# Patient Record
Sex: FEMALE | Race: WHITE | Employment: OTHER | URBAN - METROPOLITAN AREA
[De-identification: names, ages, dates, MRNs, and addresses within clinical notes are randomized per-mention and may not be internally consistent; named-entity substitution may affect disease eponyms.]

---

## 2018-05-01 ENCOUNTER — TRANSFERRED RECORDS (OUTPATIENT)
Dept: HEALTH INFORMATION MANAGEMENT | Facility: CLINIC | Age: 68
End: 2018-05-01

## 2019-01-16 ENCOUNTER — TRANSFERRED RECORDS (OUTPATIENT)
Dept: HEALTH INFORMATION MANAGEMENT | Facility: CLINIC | Age: 69
End: 2019-01-16

## 2019-02-18 ENCOUNTER — TRANSFERRED RECORDS (OUTPATIENT)
Dept: HEALTH INFORMATION MANAGEMENT | Facility: CLINIC | Age: 69
End: 2019-02-18

## 2019-05-14 ENCOUNTER — TRANSFERRED RECORDS (OUTPATIENT)
Dept: HEALTH INFORMATION MANAGEMENT | Facility: CLINIC | Age: 69
End: 2019-05-14

## 2019-06-11 RX ORDER — ALENDRONATE SODIUM 70 MG/1
70 TABLET ORAL
COMMUNITY

## 2019-06-11 RX ORDER — POTASSIUM CHLORIDE 600 MG/1
8 TABLET, FILM COATED, EXTENDED RELEASE ORAL DAILY
COMMUNITY

## 2019-06-11 RX ORDER — BISOPROLOL FUMARATE AND HYDROCHLOROTHIAZIDE 2.5; 6.25 MG/1; MG/1
1 TABLET ORAL DAILY
COMMUNITY

## 2019-06-11 RX ORDER — ASPIRIN 81 MG/1
81 TABLET ORAL DAILY
Status: ON HOLD | COMMUNITY
End: 2019-06-26

## 2019-06-11 RX ORDER — ATORVASTATIN CALCIUM 40 MG/1
40 TABLET, FILM COATED ORAL DAILY
COMMUNITY

## 2019-06-11 RX ORDER — AMOXICILLIN 500 MG
1200 CAPSULE ORAL DAILY
COMMUNITY

## 2019-06-11 RX ORDER — ESCITALOPRAM OXALATE 20 MG/1
20 TABLET ORAL DAILY
COMMUNITY

## 2019-06-11 RX ORDER — AMLODIPINE AND BENAZEPRIL HYDROCHLORIDE 5; 20 MG/1; MG/1
1 CAPSULE ORAL DAILY
COMMUNITY

## 2019-06-11 ASSESSMENT — MIFFLIN-ST. JEOR: SCORE: 1549.19

## 2019-06-13 ENCOUNTER — HOSPITAL ENCOUNTER (OUTPATIENT)
Dept: LAB | Facility: CLINIC | Age: 69
Discharge: HOME | End: 2019-06-13
Attending: ORTHOPAEDIC SURGERY | Admitting: ORTHOPAEDIC SURGERY

## 2019-06-13 DIAGNOSIS — Z01.812 PRE-OPERATIVE LABORATORY EXAMINATION: ICD-10-CM

## 2019-06-13 LAB
MRSA DNA SPEC QL NAA+PROBE: NEGATIVE
SPECIMEN SOURCE: NORMAL

## 2019-06-13 PROCEDURE — 87640 STAPH A DNA AMP PROBE: CPT | Performed by: ORTHOPAEDIC SURGERY

## 2019-06-13 PROCEDURE — 87641 MR-STAPH DNA AMP PROBE: CPT | Performed by: ORTHOPAEDIC SURGERY

## 2019-06-13 PROCEDURE — 40000829 ZZHCL STATISTIC STAPH AUREUS SUSCEPT SCREEN PCR: Performed by: ORTHOPAEDIC SURGERY

## 2019-06-13 PROCEDURE — 40000830 ZZHCL STATISTIC STAPH AUREUS METH RESIST SCREEN PCR: Performed by: ORTHOPAEDIC SURGERY

## 2019-06-26 ENCOUNTER — ANESTHESIA EVENT (OUTPATIENT)
Dept: SURGERY | Facility: CLINIC | Age: 69
DRG: 470 | End: 2019-06-26
Payer: MEDICARE

## 2019-06-26 ENCOUNTER — APPOINTMENT (OUTPATIENT)
Dept: PHYSICAL THERAPY | Facility: CLINIC | Age: 69
DRG: 470 | End: 2019-06-26
Attending: ORTHOPAEDIC SURGERY
Payer: MEDICARE

## 2019-06-26 ENCOUNTER — HOSPITAL ENCOUNTER (INPATIENT)
Facility: CLINIC | Age: 69
LOS: 2 days | Discharge: HOME | DRG: 470 | End: 2019-06-28
Attending: ORTHOPAEDIC SURGERY | Admitting: ORTHOPAEDIC SURGERY
Payer: MEDICARE

## 2019-06-26 ENCOUNTER — APPOINTMENT (OUTPATIENT)
Dept: GENERAL RADIOLOGY | Facility: CLINIC | Age: 69
DRG: 470 | End: 2019-06-26
Attending: ORTHOPAEDIC SURGERY
Payer: MEDICARE

## 2019-06-26 ENCOUNTER — ANESTHESIA (OUTPATIENT)
Dept: SURGERY | Facility: CLINIC | Age: 69
DRG: 470 | End: 2019-06-26
Payer: MEDICARE

## 2019-06-26 DIAGNOSIS — Z96.651 STATUS POST TOTAL RIGHT KNEE REPLACEMENT: Primary | ICD-10-CM

## 2019-06-26 PROBLEM — Z96.659 S/P TOTAL KNEE ARTHROPLASTY: Status: ACTIVE | Noted: 2019-06-26

## 2019-06-26 LAB
CREAT SERPL-MCNC: 0.52 MG/DL (ref 0.52–1.04)
GFR SERPL CREATININE-BSD FRML MDRD: >90 ML/MIN/{1.73_M2}
PLATELET # BLD AUTO: 245 10E9/L (ref 150–450)

## 2019-06-26 PROCEDURE — 37000009 ZZH ANESTHESIA TECHNICAL FEE, EACH ADDTL 15 MIN: Performed by: ORTHOPAEDIC SURGERY

## 2019-06-26 PROCEDURE — 36000063 ZZH SURGERY LEVEL 4 EA 15 ADDTL MIN: Performed by: ORTHOPAEDIC SURGERY

## 2019-06-26 PROCEDURE — C1776 JOINT DEVICE (IMPLANTABLE): HCPCS | Performed by: ORTHOPAEDIC SURGERY

## 2019-06-26 PROCEDURE — 36000093 ZZH SURGERY LEVEL 4 1ST 30 MIN: Performed by: ORTHOPAEDIC SURGERY

## 2019-06-26 PROCEDURE — 71000013 ZZH RECOVERY PHASE 1 LEVEL 1 EA ADDTL HR: Performed by: ORTHOPAEDIC SURGERY

## 2019-06-26 PROCEDURE — 25000132 ZZH RX MED GY IP 250 OP 250 PS 637: Mod: GY | Performed by: ORTHOPAEDIC SURGERY

## 2019-06-26 PROCEDURE — 25000125 ZZHC RX 250: Performed by: PHYSICIAN ASSISTANT

## 2019-06-26 PROCEDURE — 25800030 ZZH RX IP 258 OP 636: Performed by: ORTHOPAEDIC SURGERY

## 2019-06-26 PROCEDURE — 27810169 ZZH OR IMPLANT GENERAL: Performed by: ORTHOPAEDIC SURGERY

## 2019-06-26 PROCEDURE — 25800025 ZZH RX 258: Performed by: ORTHOPAEDIC SURGERY

## 2019-06-26 PROCEDURE — 82565 ASSAY OF CREATININE: CPT | Performed by: ORTHOPAEDIC SURGERY

## 2019-06-26 PROCEDURE — 12000000 ZZH R&B MED SURG/OB

## 2019-06-26 PROCEDURE — 37000008 ZZH ANESTHESIA TECHNICAL FEE, 1ST 30 MIN: Performed by: ORTHOPAEDIC SURGERY

## 2019-06-26 PROCEDURE — 25000128 H RX IP 250 OP 636: Performed by: ORTHOPAEDIC SURGERY

## 2019-06-26 PROCEDURE — 97116 GAIT TRAINING THERAPY: CPT | Mod: GP | Performed by: PHYSICAL THERAPIST

## 2019-06-26 PROCEDURE — 25800030 ZZH RX IP 258 OP 636: Performed by: ANESTHESIOLOGY

## 2019-06-26 PROCEDURE — 40000986 XR KNEE PORT RT 1/2 VW: Mod: RT

## 2019-06-26 PROCEDURE — 40000171 ZZH STATISTIC PRE-PROCEDURE ASSESSMENT III: Performed by: ORTHOPAEDIC SURGERY

## 2019-06-26 PROCEDURE — 0SRC0J9 REPLACEMENT OF RIGHT KNEE JOINT WITH SYNTHETIC SUBSTITUTE, CEMENTED, OPEN APPROACH: ICD-10-PCS | Performed by: ORTHOPAEDIC SURGERY

## 2019-06-26 PROCEDURE — 27210794 ZZH OR GENERAL SUPPLY STERILE: Performed by: ORTHOPAEDIC SURGERY

## 2019-06-26 PROCEDURE — 25000128 H RX IP 250 OP 636: Performed by: PHYSICIAN ASSISTANT

## 2019-06-26 PROCEDURE — 71000012 ZZH RECOVERY PHASE 1 LEVEL 1 FIRST HR: Performed by: ORTHOPAEDIC SURGERY

## 2019-06-26 PROCEDURE — 85049 AUTOMATED PLATELET COUNT: CPT | Performed by: ORTHOPAEDIC SURGERY

## 2019-06-26 PROCEDURE — 97110 THERAPEUTIC EXERCISES: CPT | Mod: GP | Performed by: PHYSICAL THERAPIST

## 2019-06-26 PROCEDURE — 99207 ZZC CONSULT E&M CHANGED TO INITIAL LEVEL: CPT | Performed by: INTERNAL MEDICINE

## 2019-06-26 PROCEDURE — 25000125 ZZHC RX 250: Performed by: ORTHOPAEDIC SURGERY

## 2019-06-26 PROCEDURE — 25000132 ZZH RX MED GY IP 250 OP 250 PS 637: Mod: GY | Performed by: PHYSICIAN ASSISTANT

## 2019-06-26 PROCEDURE — 97162 PT EVAL MOD COMPLEX 30 MIN: CPT | Mod: GP | Performed by: PHYSICAL THERAPIST

## 2019-06-26 PROCEDURE — 25000125 ZZHC RX 250: Performed by: ANESTHESIOLOGY

## 2019-06-26 PROCEDURE — 99222 1ST HOSP IP/OBS MODERATE 55: CPT | Performed by: INTERNAL MEDICINE

## 2019-06-26 PROCEDURE — 25000128 H RX IP 250 OP 636: Performed by: ANESTHESIOLOGY

## 2019-06-26 PROCEDURE — 36415 COLL VENOUS BLD VENIPUNCTURE: CPT | Performed by: ORTHOPAEDIC SURGERY

## 2019-06-26 PROCEDURE — 97530 THERAPEUTIC ACTIVITIES: CPT | Mod: GP | Performed by: PHYSICAL THERAPIST

## 2019-06-26 PROCEDURE — 25000128 H RX IP 250 OP 636: Performed by: NURSE ANESTHETIST, CERTIFIED REGISTERED

## 2019-06-26 PROCEDURE — 25000125 ZZHC RX 250: Performed by: NURSE ANESTHETIST, CERTIFIED REGISTERED

## 2019-06-26 DEVICE — IMPLANTABLE DEVICE: Type: IMPLANTABLE DEVICE | Site: KNEE | Status: FUNCTIONAL

## 2019-06-26 DEVICE — IMP COMP TKA IBALANCE MOD TIBIAL TRAY SZ 5 AR-513-T5: Type: IMPLANTABLE DEVICE | Site: KNEE | Status: FUNCTIONAL

## 2019-06-26 DEVICE — IMP COMP TKA IBALANCE FEM PS CEM SZ 5 RT AR-516-5R: Type: IMPLANTABLE DEVICE | Site: KNEE | Status: FUNCTIONAL

## 2019-06-26 DEVICE — BONE CEMENT STRK SIMPLEX P SPEEDSET 6192-1-001: Type: IMPLANTABLE DEVICE | Site: KNEE | Status: FUNCTIONAL

## 2019-06-26 RX ORDER — AMLODIPINE BESYLATE 5 MG/1
5 TABLET ORAL DAILY
Status: DISCONTINUED | OUTPATIENT
Start: 2019-06-26 | End: 2019-06-28 | Stop reason: HOSPADM

## 2019-06-26 RX ORDER — ONDANSETRON 4 MG/1
4 TABLET, ORALLY DISINTEGRATING ORAL EVERY 6 HOURS PRN
Status: DISCONTINUED | OUTPATIENT
Start: 2019-06-26 | End: 2019-06-28 | Stop reason: HOSPADM

## 2019-06-26 RX ORDER — CELECOXIB 200 MG/1
400 CAPSULE ORAL ONCE
Status: COMPLETED | OUTPATIENT
Start: 2019-06-26 | End: 2019-06-26

## 2019-06-26 RX ORDER — HYDRALAZINE HYDROCHLORIDE 20 MG/ML
2.5-5 INJECTION INTRAMUSCULAR; INTRAVENOUS EVERY 10 MIN PRN
Status: DISCONTINUED | OUTPATIENT
Start: 2019-06-26 | End: 2019-06-26 | Stop reason: HOSPADM

## 2019-06-26 RX ORDER — NALOXONE HYDROCHLORIDE 0.4 MG/ML
.1-.4 INJECTION, SOLUTION INTRAMUSCULAR; INTRAVENOUS; SUBCUTANEOUS
Status: DISCONTINUED | OUTPATIENT
Start: 2019-06-26 | End: 2019-06-26 | Stop reason: HOSPADM

## 2019-06-26 RX ORDER — POTASSIUM CHLORIDE 20MEQ/15ML
8 LIQUID (ML) ORAL DAILY
Status: DISCONTINUED | OUTPATIENT
Start: 2019-06-26 | End: 2019-06-28 | Stop reason: HOSPADM

## 2019-06-26 RX ORDER — HYDROMORPHONE HYDROCHLORIDE 1 MG/ML
.3-.5 INJECTION, SOLUTION INTRAMUSCULAR; INTRAVENOUS; SUBCUTANEOUS
Status: DISCONTINUED | OUTPATIENT
Start: 2019-06-26 | End: 2019-06-28 | Stop reason: HOSPADM

## 2019-06-26 RX ORDER — SCOLOPAMINE TRANSDERMAL SYSTEM 1 MG/1
1 PATCH, EXTENDED RELEASE TRANSDERMAL
Status: DISCONTINUED | OUTPATIENT
Start: 2019-06-26 | End: 2019-06-28 | Stop reason: HOSPADM

## 2019-06-26 RX ORDER — HYDROXYZINE HYDROCHLORIDE 25 MG/1
25 TABLET, FILM COATED ORAL 3 TIMES DAILY PRN
Status: DISCONTINUED | OUTPATIENT
Start: 2019-06-26 | End: 2019-06-28 | Stop reason: HOSPADM

## 2019-06-26 RX ORDER — LIDOCAINE 40 MG/G
CREAM TOPICAL
Status: DISCONTINUED | OUTPATIENT
Start: 2019-06-26 | End: 2019-06-28 | Stop reason: HOSPADM

## 2019-06-26 RX ORDER — HYDRALAZINE HYDROCHLORIDE 20 MG/ML
INJECTION INTRAMUSCULAR; INTRAVENOUS PRN
Status: DISCONTINUED | OUTPATIENT
Start: 2019-06-26 | End: 2019-06-26

## 2019-06-26 RX ORDER — SODIUM CHLORIDE, SODIUM LACTATE, POTASSIUM CHLORIDE, CALCIUM CHLORIDE 600; 310; 30; 20 MG/100ML; MG/100ML; MG/100ML; MG/100ML
INJECTION, SOLUTION INTRAVENOUS CONTINUOUS
Status: DISCONTINUED | OUTPATIENT
Start: 2019-06-26 | End: 2019-06-26 | Stop reason: HOSPADM

## 2019-06-26 RX ORDER — BISOPROLOL FUMARATE 5 MG/1
2.5 TABLET, FILM COATED ORAL DAILY
Status: DISCONTINUED | OUTPATIENT
Start: 2019-06-26 | End: 2019-06-28 | Stop reason: HOSPADM

## 2019-06-26 RX ORDER — MEPERIDINE HYDROCHLORIDE 50 MG/ML
12.5 INJECTION INTRAMUSCULAR; INTRAVENOUS; SUBCUTANEOUS
Status: DISCONTINUED | OUTPATIENT
Start: 2019-06-26 | End: 2019-06-26 | Stop reason: HOSPADM

## 2019-06-26 RX ORDER — CHLORAL HYDRATE 500 MG
1000 CAPSULE ORAL DAILY
Status: DISCONTINUED | OUTPATIENT
Start: 2019-06-26 | End: 2019-06-28 | Stop reason: HOSPADM

## 2019-06-26 RX ORDER — ONDANSETRON 4 MG/1
4 TABLET, ORALLY DISINTEGRATING ORAL EVERY 30 MIN PRN
Status: DISCONTINUED | OUTPATIENT
Start: 2019-06-26 | End: 2019-06-26 | Stop reason: HOSPADM

## 2019-06-26 RX ORDER — HYDROCHLOROTHIAZIDE 12.5 MG/1
6.25 TABLET ORAL DAILY
Status: DISCONTINUED | OUTPATIENT
Start: 2019-06-26 | End: 2019-06-28 | Stop reason: HOSPADM

## 2019-06-26 RX ORDER — PROPOFOL 10 MG/ML
INJECTION, EMULSION INTRAVENOUS CONTINUOUS PRN
Status: DISCONTINUED | OUTPATIENT
Start: 2019-06-26 | End: 2019-06-26

## 2019-06-26 RX ORDER — ONDANSETRON 2 MG/ML
4 INJECTION INTRAMUSCULAR; INTRAVENOUS EVERY 6 HOURS PRN
Status: DISCONTINUED | OUTPATIENT
Start: 2019-06-26 | End: 2019-06-28 | Stop reason: HOSPADM

## 2019-06-26 RX ORDER — FENTANYL CITRATE 50 UG/ML
25-50 INJECTION, SOLUTION INTRAMUSCULAR; INTRAVENOUS
Status: DISCONTINUED | OUTPATIENT
Start: 2019-06-26 | End: 2019-06-26 | Stop reason: HOSPADM

## 2019-06-26 RX ORDER — AMOXICILLIN 250 MG
2 CAPSULE ORAL 2 TIMES DAILY
Status: DISCONTINUED | OUTPATIENT
Start: 2019-06-26 | End: 2019-06-28 | Stop reason: HOSPADM

## 2019-06-26 RX ORDER — ONDANSETRON 2 MG/ML
INJECTION INTRAMUSCULAR; INTRAVENOUS PRN
Status: DISCONTINUED | OUTPATIENT
Start: 2019-06-26 | End: 2019-06-26

## 2019-06-26 RX ORDER — MULTIPLE VITAMINS W/ MINERALS TAB 9MG-400MCG
1 TAB ORAL DAILY
Status: DISCONTINUED | OUTPATIENT
Start: 2019-06-26 | End: 2019-06-28 | Stop reason: HOSPADM

## 2019-06-26 RX ORDER — BISOPROLOL FUMARATE AND HYDROCHLOROTHIAZIDE 2.5; 6.25 MG/1; MG/1
1 TABLET ORAL DAILY
Status: DISCONTINUED | OUTPATIENT
Start: 2019-06-26 | End: 2019-06-26 | Stop reason: RX

## 2019-06-26 RX ORDER — PANTOPRAZOLE SODIUM 40 MG/1
40 TABLET, DELAYED RELEASE ORAL ONCE
Status: COMPLETED | OUTPATIENT
Start: 2019-06-26 | End: 2019-06-26

## 2019-06-26 RX ORDER — FENTANYL CITRATE 50 UG/ML
INJECTION, SOLUTION INTRAMUSCULAR; INTRAVENOUS PRN
Status: DISCONTINUED | OUTPATIENT
Start: 2019-06-26 | End: 2019-06-26

## 2019-06-26 RX ORDER — AMOXICILLIN 250 MG
1 CAPSULE ORAL 2 TIMES DAILY
Qty: 30 TABLET | Refills: 0 | Status: SHIPPED | OUTPATIENT
Start: 2019-06-26

## 2019-06-26 RX ORDER — CEFAZOLIN SODIUM 2 G/100ML
2 INJECTION, SOLUTION INTRAVENOUS EVERY 8 HOURS
Status: COMPLETED | OUTPATIENT
Start: 2019-06-26 | End: 2019-06-27

## 2019-06-26 RX ORDER — ONDANSETRON 2 MG/ML
4 INJECTION INTRAMUSCULAR; INTRAVENOUS EVERY 6 HOURS
Status: DISPENSED | OUTPATIENT
Start: 2019-06-26 | End: 2019-06-27

## 2019-06-26 RX ORDER — ESCITALOPRAM OXALATE 20 MG/1
20 TABLET ORAL DAILY
Status: DISCONTINUED | OUTPATIENT
Start: 2019-06-27 | End: 2019-06-28 | Stop reason: HOSPADM

## 2019-06-26 RX ORDER — LISINOPRIL 20 MG/1
20 TABLET ORAL DAILY
Status: DISCONTINUED | OUTPATIENT
Start: 2019-06-26 | End: 2019-06-28 | Stop reason: HOSPADM

## 2019-06-26 RX ORDER — ALBUTEROL SULFATE 0.83 MG/ML
2.5 SOLUTION RESPIRATORY (INHALATION) EVERY 4 HOURS PRN
Status: DISCONTINUED | OUTPATIENT
Start: 2019-06-26 | End: 2019-06-26 | Stop reason: HOSPADM

## 2019-06-26 RX ORDER — FENTANYL CITRATE 50 UG/ML
25-50 INJECTION, SOLUTION INTRAMUSCULAR; INTRAVENOUS EVERY 5 MIN PRN
Status: DISCONTINUED | OUTPATIENT
Start: 2019-06-26 | End: 2019-06-26 | Stop reason: HOSPADM

## 2019-06-26 RX ORDER — LIDOCAINE 40 MG/G
CREAM TOPICAL
Status: DISCONTINUED | OUTPATIENT
Start: 2019-06-26 | End: 2019-06-26

## 2019-06-26 RX ORDER — OXYCODONE HYDROCHLORIDE 5 MG/1
5 TABLET ORAL EVERY 4 HOURS PRN
Status: DISCONTINUED | OUTPATIENT
Start: 2019-06-26 | End: 2019-06-26

## 2019-06-26 RX ORDER — CEFAZOLIN SODIUM 1 G/3ML
1 INJECTION, POWDER, FOR SOLUTION INTRAMUSCULAR; INTRAVENOUS SEE ADMIN INSTRUCTIONS
Status: DISCONTINUED | OUTPATIENT
Start: 2019-06-26 | End: 2019-06-26

## 2019-06-26 RX ORDER — PROPOFOL 10 MG/ML
INJECTION, EMULSION INTRAVENOUS PRN
Status: DISCONTINUED | OUTPATIENT
Start: 2019-06-26 | End: 2019-06-26

## 2019-06-26 RX ORDER — METOPROLOL TARTRATE 1 MG/ML
1-2 INJECTION, SOLUTION INTRAVENOUS EVERY 5 MIN PRN
Status: DISCONTINUED | OUTPATIENT
Start: 2019-06-26 | End: 2019-06-26 | Stop reason: HOSPADM

## 2019-06-26 RX ORDER — DEXAMETHASONE SODIUM PHOSPHATE 4 MG/ML
INJECTION, SOLUTION INTRA-ARTICULAR; INTRALESIONAL; INTRAMUSCULAR; INTRAVENOUS; SOFT TISSUE PRN
Status: DISCONTINUED | OUTPATIENT
Start: 2019-06-26 | End: 2019-06-26

## 2019-06-26 RX ORDER — ONDANSETRON 2 MG/ML
4 INJECTION INTRAMUSCULAR; INTRAVENOUS EVERY 30 MIN PRN
Status: DISCONTINUED | OUTPATIENT
Start: 2019-06-26 | End: 2019-06-26 | Stop reason: HOSPADM

## 2019-06-26 RX ORDER — OXYCODONE AND ACETAMINOPHEN 5; 325 MG/1; MG/1
1-2 TABLET ORAL EVERY 4 HOURS PRN
Qty: 60 TABLET | Refills: 0 | Status: SHIPPED | OUTPATIENT
Start: 2019-06-26

## 2019-06-26 RX ORDER — CELECOXIB 200 MG/1
200 CAPSULE ORAL 2 TIMES DAILY
Status: DISCONTINUED | OUTPATIENT
Start: 2019-06-26 | End: 2019-06-28 | Stop reason: HOSPADM

## 2019-06-26 RX ORDER — SODIUM CHLORIDE, SODIUM LACTATE, POTASSIUM CHLORIDE, CALCIUM CHLORIDE 600; 310; 30; 20 MG/100ML; MG/100ML; MG/100ML; MG/100ML
INJECTION, SOLUTION INTRAVENOUS CONTINUOUS
Status: DISCONTINUED | OUTPATIENT
Start: 2019-06-26 | End: 2019-06-26

## 2019-06-26 RX ORDER — SODIUM CHLORIDE 9 MG/ML
INJECTION, SOLUTION INTRAVENOUS CONTINUOUS
Status: DISCONTINUED | OUTPATIENT
Start: 2019-06-26 | End: 2019-06-28 | Stop reason: HOSPADM

## 2019-06-26 RX ORDER — AMOXICILLIN 250 MG
1 CAPSULE ORAL 2 TIMES DAILY
Status: DISCONTINUED | OUTPATIENT
Start: 2019-06-26 | End: 2019-06-28 | Stop reason: HOSPADM

## 2019-06-26 RX ORDER — NALOXONE HYDROCHLORIDE 0.4 MG/ML
.1-.4 INJECTION, SOLUTION INTRAMUSCULAR; INTRAVENOUS; SUBCUTANEOUS
Status: DISCONTINUED | OUTPATIENT
Start: 2019-06-26 | End: 2019-06-28 | Stop reason: HOSPADM

## 2019-06-26 RX ORDER — CEFAZOLIN SODIUM 2 G/100ML
2 INJECTION, SOLUTION INTRAVENOUS
Status: COMPLETED | OUTPATIENT
Start: 2019-06-26 | End: 2019-06-26

## 2019-06-26 RX ORDER — ACETAMINOPHEN 325 MG/1
975 TABLET ORAL EVERY 8 HOURS
Status: DISCONTINUED | OUTPATIENT
Start: 2019-06-26 | End: 2019-06-28 | Stop reason: HOSPADM

## 2019-06-26 RX ORDER — HYDROMORPHONE HYDROCHLORIDE 1 MG/ML
.3-.5 INJECTION, SOLUTION INTRAMUSCULAR; INTRAVENOUS; SUBCUTANEOUS EVERY 10 MIN PRN
Status: DISCONTINUED | OUTPATIENT
Start: 2019-06-26 | End: 2019-06-26 | Stop reason: HOSPADM

## 2019-06-26 RX ORDER — OXYCODONE HYDROCHLORIDE 5 MG/1
5-10 TABLET ORAL EVERY 4 HOURS PRN
Status: DISCONTINUED | OUTPATIENT
Start: 2019-06-26 | End: 2019-06-28 | Stop reason: HOSPADM

## 2019-06-26 RX ORDER — ACETAMINOPHEN 325 MG/1
650 TABLET ORAL EVERY 4 HOURS PRN
Status: DISCONTINUED | OUTPATIENT
Start: 2019-06-29 | End: 2019-06-28 | Stop reason: HOSPADM

## 2019-06-26 RX ORDER — EPHEDRINE SULFATE 50 MG/ML
INJECTION, SOLUTION INTRAVENOUS PRN
Status: DISCONTINUED | OUTPATIENT
Start: 2019-06-26 | End: 2019-06-26

## 2019-06-26 RX ORDER — GLYCINE 1.5 G/100ML
SOLUTION IRRIGATION PRN
Status: DISCONTINUED | OUTPATIENT
Start: 2019-06-26 | End: 2019-06-26 | Stop reason: HOSPADM

## 2019-06-26 RX ORDER — ATORVASTATIN CALCIUM 40 MG/1
40 TABLET, FILM COATED ORAL DAILY
Status: DISCONTINUED | OUTPATIENT
Start: 2019-06-26 | End: 2019-06-28 | Stop reason: HOSPADM

## 2019-06-26 RX ORDER — ZOLPIDEM TARTRATE 5 MG/1
5 TABLET ORAL
Status: DISCONTINUED | OUTPATIENT
Start: 2019-06-27 | End: 2019-06-28 | Stop reason: HOSPADM

## 2019-06-26 RX ORDER — AMLODIPINE AND BENAZEPRIL HYDROCHLORIDE 5; 20 MG/1; MG/1
1 CAPSULE ORAL DAILY
Status: DISCONTINUED | OUTPATIENT
Start: 2019-06-26 | End: 2019-06-26 | Stop reason: RX

## 2019-06-26 RX ADMIN — ACETAMINOPHEN 975 MG: 325 TABLET, FILM COATED ORAL at 20:13

## 2019-06-26 RX ADMIN — LISINOPRIL 20 MG: 20 TABLET ORAL at 17:07

## 2019-06-26 RX ADMIN — OXYCODONE HYDROCHLORIDE 5 MG: 5 TABLET ORAL at 20:13

## 2019-06-26 RX ADMIN — SODIUM CHLORIDE, POTASSIUM CHLORIDE, SODIUM LACTATE AND CALCIUM CHLORIDE: 600; 310; 30; 20 INJECTION, SOLUTION INTRAVENOUS at 12:40

## 2019-06-26 RX ADMIN — PANTOPRAZOLE SODIUM 40 MG: 40 TABLET, DELAYED RELEASE ORAL at 09:19

## 2019-06-26 RX ADMIN — Medication 1 G: at 11:27

## 2019-06-26 RX ADMIN — Medication 1 G: at 10:11

## 2019-06-26 RX ADMIN — SODIUM CHLORIDE, POTASSIUM CHLORIDE, SODIUM LACTATE AND CALCIUM CHLORIDE: 600; 310; 30; 20 INJECTION, SOLUTION INTRAVENOUS at 11:02

## 2019-06-26 RX ADMIN — ACETAMINOPHEN 975 MG: 325 TABLET, FILM COATED ORAL at 13:39

## 2019-06-26 RX ADMIN — FENTANYL CITRATE 250 MCG: 50 INJECTION, SOLUTION INTRAMUSCULAR; INTRAVENOUS at 10:08

## 2019-06-26 RX ADMIN — SODIUM CHLORIDE 1000 ML: 9 INJECTION, SOLUTION INTRAVENOUS at 17:01

## 2019-06-26 RX ADMIN — HYDROMORPHONE HYDROCHLORIDE 0.5 MG: 1 INJECTION, SOLUTION INTRAMUSCULAR; INTRAVENOUS; SUBCUTANEOUS at 12:30

## 2019-06-26 RX ADMIN — HYDROMORPHONE HYDROCHLORIDE 0.5 MG: 1 INJECTION, SOLUTION INTRAMUSCULAR; INTRAVENOUS; SUBCUTANEOUS at 13:28

## 2019-06-26 RX ADMIN — PROPOFOL 150 MG: 10 INJECTION, EMULSION INTRAVENOUS at 10:08

## 2019-06-26 RX ADMIN — MIDAZOLAM 2 MG: 1 INJECTION INTRAMUSCULAR; INTRAVENOUS at 09:21

## 2019-06-26 RX ADMIN — HYDRALAZINE HYDROCHLORIDE 4 MG: 20 INJECTION INTRAMUSCULAR; INTRAVENOUS at 10:51

## 2019-06-26 RX ADMIN — CEFAZOLIN SODIUM 2 G: 2 INJECTION, SOLUTION INTRAVENOUS at 18:20

## 2019-06-26 RX ADMIN — HYDROMORPHONE HYDROCHLORIDE 0.5 MG: 1 INJECTION, SOLUTION INTRAMUSCULAR; INTRAVENOUS; SUBCUTANEOUS at 10:35

## 2019-06-26 RX ADMIN — CELECOXIB 400 MG: 200 CAPSULE ORAL at 09:19

## 2019-06-26 RX ADMIN — GLYCOPYRROLATE 8 MEQ: 0.2 INJECTION INTRAMUSCULAR; INTRAVENOUS at 17:14

## 2019-06-26 RX ADMIN — CEFAZOLIN SODIUM 2 G: 2 INJECTION, SOLUTION INTRAVENOUS at 09:58

## 2019-06-26 RX ADMIN — CELECOXIB 200 MG: 200 CAPSULE ORAL at 20:13

## 2019-06-26 RX ADMIN — PROPOFOL 50 MCG/KG/MIN: 10 INJECTION, EMULSION INTRAVENOUS at 10:11

## 2019-06-26 RX ADMIN — ATORVASTATIN CALCIUM 40 MG: 40 TABLET, FILM COATED ORAL at 17:07

## 2019-06-26 RX ADMIN — EPHEDRINE SULFATE 5 MG: 50 INJECTION, SOLUTION INTRAVENOUS at 10:14

## 2019-06-26 RX ADMIN — ONDANSETRON 4 MG: 2 INJECTION INTRAMUSCULAR; INTRAVENOUS at 11:34

## 2019-06-26 RX ADMIN — HYDROMORPHONE HYDROCHLORIDE 0.5 MG: 1 INJECTION, SOLUTION INTRAMUSCULAR; INTRAVENOUS; SUBCUTANEOUS at 13:00

## 2019-06-26 RX ADMIN — HYDROMORPHONE HYDROCHLORIDE 0.5 MG: 1 INJECTION, SOLUTION INTRAMUSCULAR; INTRAVENOUS; SUBCUTANEOUS at 13:13

## 2019-06-26 RX ADMIN — PROPOFOL 50 MG: 10 INJECTION, EMULSION INTRAVENOUS at 10:35

## 2019-06-26 RX ADMIN — Medication 6.25 MG: at 17:07

## 2019-06-26 RX ADMIN — DEXAMETHASONE SODIUM PHOSPHATE 4 MG: 4 INJECTION, SOLUTION INTRA-ARTICULAR; INTRALESIONAL; INTRAMUSCULAR; INTRAVENOUS; SOFT TISSUE at 10:08

## 2019-06-26 RX ADMIN — HYDROMORPHONE HYDROCHLORIDE 0.5 MG: 1 INJECTION, SOLUTION INTRAMUSCULAR; INTRAVENOUS; SUBCUTANEOUS at 11:57

## 2019-06-26 RX ADMIN — EPHEDRINE SULFATE 5 MG: 50 INJECTION, SOLUTION INTRAVENOUS at 10:17

## 2019-06-26 RX ADMIN — SCOPALAMINE 1 PATCH: 1 PATCH, EXTENDED RELEASE TRANSDERMAL at 09:19

## 2019-06-26 RX ADMIN — OXYCODONE HYDROCHLORIDE 5 MG: 5 TABLET ORAL at 13:37

## 2019-06-26 RX ADMIN — SENNOSIDES AND DOCUSATE SODIUM 2 TABLET: 8.6; 5 TABLET ORAL at 20:13

## 2019-06-26 RX ADMIN — SODIUM CHLORIDE, POTASSIUM CHLORIDE, SODIUM LACTATE AND CALCIUM CHLORIDE: 600; 310; 30; 20 INJECTION, SOLUTION INTRAVENOUS at 09:03

## 2019-06-26 RX ADMIN — FENTANYL CITRATE 50 MCG: 50 INJECTION INTRAMUSCULAR; INTRAVENOUS at 12:28

## 2019-06-26 RX ADMIN — AMLODIPINE BESYLATE 5 MG: 5 TABLET ORAL at 17:07

## 2019-06-26 RX ADMIN — LIDOCAINE HYDROCHLORIDE 50 MG: 10 INJECTION, SOLUTION EPIDURAL; INFILTRATION; INTRACAUDAL; PERINEURAL at 10:08

## 2019-06-26 RX ADMIN — FENTANYL CITRATE 100 MCG: 50 INJECTION, SOLUTION INTRAMUSCULAR; INTRAVENOUS at 09:21

## 2019-06-26 RX ADMIN — HYDRALAZINE HYDROCHLORIDE 2 MG: 20 INJECTION INTRAMUSCULAR; INTRAVENOUS at 11:54

## 2019-06-26 RX ADMIN — RANITIDINE 150 MG: 150 TABLET ORAL at 20:13

## 2019-06-26 RX ADMIN — SODIUM CHLORIDE: 9 INJECTION, SOLUTION INTRAVENOUS at 17:06

## 2019-06-26 RX ADMIN — FENTANYL CITRATE 50 MCG: 50 INJECTION INTRAMUSCULAR; INTRAVENOUS at 12:37

## 2019-06-26 RX ADMIN — HYDRALAZINE HYDROCHLORIDE 4 MG: 20 INJECTION INTRAMUSCULAR; INTRAVENOUS at 10:39

## 2019-06-26 RX ADMIN — ONDANSETRON 4 MG: 2 INJECTION INTRAMUSCULAR; INTRAVENOUS at 17:02

## 2019-06-26 ASSESSMENT — ACTIVITIES OF DAILY LIVING (ADL)
ADLS_ACUITY_SCORE: 14
ADLS_ACUITY_SCORE: 17

## 2019-06-26 ASSESSMENT — MIFFLIN-ST. JEOR: SCORE: 1576.54

## 2019-06-26 NOTE — CONSULTS
[facility]    Hospitalist Consultation    Date of Admission:  6/26/2019  Date of Consult (When I saw the patient): 06/26/19    Assessment & Plan   Renee Villareal is a 68 year old female patient with past medial history of CAD, osteoarthritis, hypertension, admitted for elective surgery. She underwent right total knee arthroplasty today. We were consulted for postop medical management    1. S/P right TKA, POD#0  Pan management, DVT prophylaxis, PT/OT per primary team    2. CAD   Currently no evidence of chest pain. Resumed on Lipitor. Aspirin on hold for now    3. Hypertension. Resume amlodipine. Will monitor blood pressure closely    DVT Prophylaxis: Defer to primary service  Code Status: Full Code    Disposition: Expected discharge per primary team    Jackie Constantino MD    Reason for Consult   Reason for consult: I was asked by Dr. Bernard for postop medical management     Primary Care Physician   [facility]    Chief Complaint   Postop medical management    History is obtained from the patient    History of Present Illness   Renee Villareal is a 68 year old female patient with past medial history of CAD, osteoarthritis, hypertension, admitted for elective surgery. She underwent right total knee arthroplasty today. We were consulted for postop medical management. Currently she denies shortness of breath or chest pain. Her pain is well controlled. She has no nausea or vomiting.     Past Medical History    I have reviewed this patient's medical history and updated it with pertinent information if needed.   Past Medical History:   Diagnosis Date     Arthritis     OA, knees     Coronary artery disease      Hypertension      PONV (postoperative nausea and vomiting)        Past Surgical History   I have reviewed this patient's surgical history and updated it with pertinent information if needed.  Past Surgical History:   Procedure Laterality Date     CARPAL TUNNEL RELEASE  RT/LT  2018    right     COLONOSCOPY       GYN SURGERY      ovarian cystectomy, in late 20's     ORTHOPEDIC SURGERY  2007    left knee arthroplasty     VASCULAR SURGERY      vein stripping bilat LE       Prior to Admission Medications   Prior to Admission Medications   Prescriptions Last Dose Informant Patient Reported? Taking?   Calcium-Magnesium-Vitamin D (CALCIUM 500 PO) 6/25/2019 at Unknown time  Yes Yes   Sig: Take by mouth 2 times daily   Multiple Vitamins-Minerals (MULTIVITAMIN PO) 6/25/2019 at Unknown time  Yes Yes   Sig: Take by mouth daily   Omega-3 Fatty Acids (FISH OIL) 1200 MG capsule Past Month at Unknown time  Yes Yes   Sig: Take 1,200 mg by mouth daily   alendronate (FOSAMAX) 70 MG tablet Past Week at Unknown time  Yes Yes   Sig: Take 70 mg by mouth every 7 days   amLODIPine-benazepril (LOTREL) 5-20 MG capsule Unknown at Unknown time  Yes No   Sig: Take 1 capsule by mouth daily   aspirin 81 MG EC tablet Past Month at Unknown time  Yes Yes   Sig: Take 81 mg by mouth daily   atorvastatin (LIPITOR) 40 MG tablet 6/25/2019 at Unknown time  Yes Yes   Sig: Take 40 mg by mouth daily   bisoprolol-hydrochlorothiazide (ZIAC) 2.5-6.25 MG tablet Unknown at Unknown time  Yes No   Sig: Take 1 tablet by mouth daily   escitalopram (LEXAPRO) 20 MG tablet 6/26/2019 at Unknown time  Yes Yes   Sig: Take 20 mg by mouth daily   potassium chloride ER (KLOR-CON) 8 MEQ CR tablet 6/25/2019 at Unknown time  Yes Yes   Sig: Take 8 mEq by mouth daily      Facility-Administered Medications: None     Allergies   No Known Allergies    Social History   I have reviewed this patient's social history and updated it with pertinent information if needed. Renee DOUGLAS Mono  reports that she has never smoked. She has never used smokeless tobacco. She reports that she drinks alcohol. She reports that she does not use drugs.    Family History   I have reviewed this patient's family history and updated it with pertinent information if needed.    History reviewed. No pertinent family history.    Review of Systems   The 10 point Review of Systems is negative other than noted in the HPI or here. S/p right TKA    Physical Exam   Temp: 95.5  F (35.3  C) Temp src: Oral BP: 134/60 Pulse: 72 Heart Rate: 61 Resp: 15 SpO2: 91 % O2 Device: Nasal cannula Oxygen Delivery: 3 LPM  Vital Signs with Ranges  Temp:  [95.5  F (35.3  C)-98.7  F (37.1  C)] 95.5  F (35.3  C)  Pulse:  [55-79] 72  Heart Rate:  [54-80] 61  Resp:  [9-24] 15  BP: (111-150)/() 134/60  SpO2:  [90 %-99 %] 91 %  206 lbs 0 oz    GEN:  Alert, oriented x 3, appears comfortable, NAD.  HEENT:  Normocephalic/atraumatic, no scleral icterus, no nasal discharge, mouth moist.  CV:  Regular rate and rhythm, no murmur or JVD.  S1 + S2 noted, no S3 or S4.  LUNGS:  Clear to auscultation bilaterally without rales/rhonchi/wheezing/retractions.  Symmetric chest rise on inhalation noted.  ABD:  Active bowel sounds, soft, non-tender/non-distended.  No rebound/guarding/rigidity.  EXT:  No edema or cyanosis.  Hands/feet warm to touch with good signs of peripheral perfusion.  No joint synovitis noted.  SKIN:  Dry to touch, no exanthems noted in the visualized areas.  NEURO:  Symmetric muscle strength, sensation to touch grossly intact.  No new focal deficits appreciated.    Data   -Data reviewed today: All pertinent laboratory and imaging results from this encounter were reviewed. I personally reviewed no images or EKG's today.  No lab results found in last 7 days.    Recent Results (from the past 24 hour(s))   XR Knee Port Right 1/2 Views    Narrative    RIGHT KNEE TWO VIEWS  6/26/2019 12:26 PM     HISTORY:  Post-op right total knee arthroplasty.    COMPARISON: None.      Impression    IMPRESSION: Right total knee replacement. Skin staples and  periarticular drain in place. Marked overlying soft tissue swelling.  No evidence of fracture or dislocation.    MELBA TANG MD

## 2019-06-26 NOTE — PLAN OF CARE
Patient arrived on floor at 1510 from PACU. Lethargic but oriented. On 3L O2. Tolerating clear liquid diet. Transfers with Ax2, gait belt, KI, and walker. Dressing CDI, numbness to RLE d/t block. Brown putting out adequate amounts, Hemovac patent. Pain managed with scheduled Tylenol. Plans d/c to home.

## 2019-06-26 NOTE — ANESTHESIA PROCEDURE NOTES
Peripheral nerve/Neuraxial procedure note : Saphenous  Pre-Procedure  Performed by Angel Leiva MD  Referred by [facility]  Location: pre-op    Procedure Times:6/26/2019 9:21 AM and 6/26/2019 9:24 AM  Pre-Anesthestic Checklist: patient identified, IV checked, site marked, risks and benefits discussed, informed consent, monitors and equipment checked, pre-op evaluation, at physician/surgeon's request and post-op pain management    Timeout  Correct Patient: Yes   Correct Procedure: Yes   Correct Site: Yes   Correct Laterality: Yes   Correct Position: Yes   Site Marked: Yes   .   Procedure Documentation    Diagnosis:RIGHT KNEE DJD.    Procedure:  right  Saphenous.     Ultrasound used to identify targeted nerve, plexus, or vascular marker and placed a needle adjacent to it., Ultrasound was used to visualize the spread of the anesthetic in close proximity to the above stated nerve.   Patient Prep;mask, sterile gloves, povidone-iodine 7.5% surgical scrub.  .  Needle: insulated, short bevel Needle Gauge: 21.    Needle Length (Inches) 4  Insertion Method: Single Shot.       Assessment/Narrative  Paresthesias: No.  Injection made incrementally with aspirations every 5 mL..  The placement was negative for: blood aspirated, painful injection and site bleeding.  Bolus given via needle..   Secured via.   Complications: none. Comments:  The surgeon has given a verbal order transferring care of this patient to me for the performance of a regional analgesia block for post-op pain control. It is requested of me because I am uniquely trained and qualified to perform this block and the surgeon is neither trained nor qualified to perform this procedure.    20 ml 0.5% bupivacaine

## 2019-06-26 NOTE — ANESTHESIA POSTPROCEDURE EVALUATION
Patient: Renee Villareal    Procedure(s):  Right total knee arthroplasty    Diagnosis:djd  Diagnosis Additional Information: No value filed.    Anesthesia Type:  General, LMA, Periph. Nerve Block for postop pain    Note:  Anesthesia Post Evaluation    Patient location during evaluation: PACU  Patient participation: Able to participate in evaluation but full recovery from regional anesthesia has not yet ocurrred but is anticipated to occur within 48 hours  Level of consciousness: awake  Pain management: adequate  Airway patency: patent  Cardiovascular status: acceptable  Respiratory status: acceptable  Hydration status: acceptable  PONV: controlled     Anesthetic complications: None          Last vitals:  Vitals:    06/26/19 1400 06/26/19 1415 06/26/19 1430   BP: 111/67 127/73 131/82   Pulse:      Resp: 21 12 18   Temp:      SpO2: 95% 96% 98%         Electronically Signed By: Musa Farmer MD  June 26, 2019  2:36 PM

## 2019-06-26 NOTE — PROGRESS NOTES
06/26/19 1621   Quick Adds   Type of Visit Initial PT Evaluation   Living Environment   Lives With spouse   Living Arrangements house   Home Accessibility stairs to enter home;stairs within home   Number of Stairs, Main Entrance 2   Stair Railings, Main Entrance none   Number of Stairs, Within Home, Primary   (flight to lower level)   Transportation Anticipated car, drives self;family or friend will provide   Living Environment Comment Lives with spouse, who will be home/available for support prn.  Has walk in shower, RTS.   Self-Care   Usual Activity Tolerance moderate  (limited by knee pain)   Current Activity Tolerance fair   Regular Exercise No   Equipment Currently Used at Home cane, straight   Activity/Exercise/Self-Care Comment Indep PLOF, uses SEC for longer distances, owns FWW.   Functional Level Prior   Ambulation 1-->assistive equipment   Transferring 0-->independent   Toileting 0-->independent   Bathing 0-->independent   Communication 0-->understands/communicates without difficulty   Swallowing 0-->swallows foods/liquids without difficulty   Cognition 0 - no cognition issues reported   Fall history within last six months no   Which of the above functional risks had a recent onset or change? ambulation;transferring   General Information   Onset of Illness/Injury or Date of Surgery - Date 06/26/19   Referring Physician Willis Bernard MD   Patient/Family Goals Statement dc home with OP PT, pt states she hopes to stay 3 days   Pertinent History of Current Problem (include personal factors and/or comorbidities that impact the POC) POD 0 s/p R TKA.  Past medial history of CAD, osteoarthritis, hypertension, previous L TKA ~12 yrs ago.   Precautions/Limitations fall precautions   Weight-Bearing Status - RLE weight-bearing as tolerated   General Observations pt awake but lethargic post op   Cognitive Status Examination   Orientation orientation to person, place and time   Pain Assessment   Patient  Currently in Pain Yes, see Vital Sign flowsheet  (7-8/10 R knee pain)   Integumentary/Edema   Integumentary/Edema Comments Ace wrap in place, hemovac x 1   Posture    Posture Forward head position   Range of Motion (ROM)   ROM Comment WFL LLE and BUEs, R knee limited ROM post op due to pain, ~12-65 deg AAROM.   Strength   Strength Comments WFL LLE and BUEs, generalized proximal hip weakness. R knee fair quad strength/control post op, min A with SLR, limited by pain and lethargy.   Bed Mobility   Bed Mobility Comments Min A supine <> sit   Transfer Skills   Transfer Comments Min-mod A sit <> stand with FWW and KI donned, limited by pain, weakness, lethargy.   Gait   Gait Comments Amb at bedside x 15ft with FWW, KI, and CGA.  Limited by lethargy and pain post op.   Balance   Balance Comments Impaired standing balance and gait stability post op, compensating well with FWW, Ax1 for safety.   Sensory Examination   Sensory Perception Comments some mild n/t reported R foot post op.   Muscle Tone   Muscle Tone Comments mild mm guarding R knee post op   Modality Interventions   Planned Modality Interventions Cryotherapy   Planned Modality Interventions Comments prn for pain control   General Therapy Interventions   Planned Therapy Interventions balance training;bed mobility training;gait training;neuromuscular re-education;ROM;strengthening;stretching;transfer training;risk factor education;home program guidelines;progressive activity/exercise   Clinical Impression   Criteria for Skilled Therapeutic Intervention yes, treatment indicated   PT Diagnosis Impaired functional mobility s/p TKA   Influenced by the following impairments Pain, weakness, dec ROM, impaired balance   Functional limitations due to impairments Impaired bed mobility, transfers, gait, stairs, ADLs   Clinical Presentation Evolving/Changing   Clinical Presentation Rationale acute post op medical status, PMH, PLOF, currently very lethargic   Clinical Decision  "Making (Complexity) Moderate complexity   Therapy Frequency 2x/day   Predicted Duration of Therapy Intervention (days/wks) 2-3 days   Anticipated Equipment Needs at Discharge   (owns SEC and FWW)   Anticipated Discharge Disposition Home with Assist;Home with Outpatient Therapy   Risk & Benefits of therapy have been explained Yes   Patient, Family & other staff in agreement with plan of care Yes   [facility] TM \"6 Clicks\"   2016, Trustees of [facility], under license to [employer].  All rights reserved.   6 Clicks Short Forms Basic Mobility Inpatient Short Form   [facility]  \"6 Clicks\" V.2 Basic Mobility Inpatient Short Form   1. Turning from your back to your side while in a flat bed without using bedrails? 3 - A Little   2. Moving from lying on your back to sitting on the side of a flat bed without using bedrails? 3 - A Little   3. Moving to and from a bed to a chair (including a wheelchair)? 3 - A Little   4. Standing up from a chair using your arms (e.g., wheelchair, or bedside chair)? 3 - A Little   5. To walk in hospital room? 3 - A Little   6. Climbing 3-5 steps with a railing? 2 - A Lot   Basic Mobility Raw Score (Score out of 24.Lower scores equate to lower levels of function) 17   Total Evaluation Time   Total Evaluation Time (Minutes) 15     "

## 2019-06-26 NOTE — ANESTHESIA CARE TRANSFER NOTE
Patient: Renee Villareal    Procedure(s):  Right total knee arthroplasty    Diagnosis: djd  Diagnosis Additional Information: No value filed.    Anesthesia Type:   General, LMA, Periph. Nerve Block for postop pain     Note:  Airway :Nasal Cannula  Patient transferred to:PACU  Comments: Spontaneous respirations, oral suctioned, bilateral eye opening and hand grasps.  Extubated to NC O2 2lpm.  VSS to PACU.Handoff Report: Identifed the Patient, Identified the Reponsible Provider, Reviewed the pertinent medical history, Discussed the surgical course, Reviewed Intra-OP anesthesia mangement and issues during anesthesia, Set expectations for post-procedure period and Allowed opportunity for questions and acknowledgement of understanding      Vitals: (Last set prior to Anesthesia Care Transfer)    CRNA VITALS  6/26/2019 1130 - 6/26/2019 1209      6/26/2019             NIBP:  150/80    Pulse:  68    Resp Rate (observed):  18                Electronically Signed By: YONI Liao CRNA  June 26, 2019  12:09 PM

## 2019-06-26 NOTE — OP NOTE
Procedure Date: 06/26/2019      PREOPERATIVE DIAGNOSIS:  Right knee valgus osteoarthritis.      POSTOPERATIVE DIAGNOSIS:  Right knee valgus osteoarthritis.      PROCEDURE:  Right total knee arthroplasty using an Arthrex iBalance knee system, size 5 femur, size 5 tibia and a 34 tri-peg patellar button.      SURGEON:  Willis Bernard MD.      ASSISTANT:  Ann Juárez PA-C.      INDICATIONS:  Ms. Villareal is a very pleasant 68-year-old female who has had ongoing right knee pain secondary to degenerative changes for some time now, failed conservative management, oral analgesics, activity modifications, and injections and now wishes to proceed with operative intervention.  We had a long discussion of the risks, benefits and alternatives to total knee arthroplasty.  She understands these and wished to proceed with surgery.      NARRATIVE EVENTS:  After thorough preoperative evaluation and proper identification of patient and extremity to be operated on, Ms. Villareal was taken to the operating room where he underwent a general anesthetic, was placed supine on the operating table, and her right leg was prepped and draped in the usual sterile manner.  After an appropriate surgical pause confirming the patient and extremity to be operated on, the patient received 2 grams of Ancef.        Her right knee was approached through a midline incision centered over the patella.  Skin and soft tissues were sharply dissected down to the patella where a median parapatellar arthrotomy was performed.  We performed a mild medial release according to our preoperative templating, everted the patella, removed the infrapatellar fat pad, flexed the knee, removed the osteophytes from the distal femur, drilled and placed the intramedullary guide for our distal femoral resection.  We resected this femur at 5 degrees physiologic valgus to the femur, resecting 9 mm of distal femoral bone.  Once this was done, we then sized the distal femur, sized to  a size 5 Arthrex iBalance femoral component.  We pinned our 4-in-1 cutting block in 3 degrees of external rotation of the posterior condyles in line with the epicondylar axis perpendicular to Whitesides line.  We made our anterior, posterior, and chamfer resections.  We then proceeded to the tibia.  Here we resected this perpendicular to the long axis of the tibia using extramedullary guides.  Once this was done, we removed the excess soft tissue from the knee, mainly both cruciate ligaments and both menisci.  Once this was done, we then made our box cut for posterior stabilized femoral component and placed our trial implants into position, a size 5 femur, a size 5 tibia, and a 12 mm thick PS polyethylene insert.  This gave us good stability and full range of motion and the patella tracked nicely.  We then paid our attention to the patella which measured 24 mm in thickness prior to resection.  We resected this down to 14 mm and placed a 10 mm thick x 34 mm diameter round tri-peg patellar button.  With patella and button in position, this measured 24 mm and tracked quite nicely.  At this point, we marked our tibial rotation, we punched for our tibial keel, thoroughly irrigated the knee and prepared to cement in our final components.  Using one batch of Simplex SpeedSet cement, we cemented in a size 5 femur, size 5 tibia, and a 34 round tri-peg patellar button.  Once the cement had cured, we retrialed our polyethylene inserts, found that a 12 mm PS insert gave us the best stability and full range of motion, so this was impacted into position.        Once this was done, we then thoroughly irrigated the knee with both saline and IrriSept solution.  We closed the arthrotomy with #1 and 0 Vicryl sutures, placed one drain deep to the arthrotomy.  We closed skin and soft tissue with absorbable sutures and staples in the skin.  The patient was placed in a well-padded postoperative dressing and taken to the recovery room in  stable condition.  She tolerated the procedure without difficulty.         JATIN INTERIANO MD             D: 2019   T: 2019   MT: WT      Name:     YAMILETH SAVAGE   MRN:      [id number]        Account:        [id number]   :      1950           Procedure Date: 2019      Document: [id number]

## 2019-06-26 NOTE — ANESTHESIA PREPROCEDURE EVALUATION
Anesthesia Pre-Procedure Evaluation    Patient: Renee Villareal   MRN: [id number] : 1950          Preoperative Diagnosis: djd    Procedure(s):  Right total knee arthroplasty (choice anesthesia)    Past Medical History:   Diagnosis Date     Arthritis     OA, knees     Coronary artery disease      Hypertension      PONV (postoperative nausea and vomiting)      Past Surgical History:   Procedure Laterality Date     CARPAL TUNNEL RELEASE RT/LT  2018    right     COLONOSCOPY       GYN SURGERY      ovarian cystectomy, in late      ORTHOPEDIC SURGERY  2007    left knee arthroplasty     VASCULAR SURGERY      vein stripping bilat LE     Anesthesia Evaluation     . Pt has had prior anesthetic.     History of anesthetic complications   - PONV        ROS/MED HX    ENT/Pulmonary:  - neg pulmonary ROS    (-) sleep apnea   Neurologic:       Cardiovascular:     (+) Dyslipidemia, hypertension--CAD, --. : . . . :. .       METS/Exercise Tolerance:     Hematologic:         Musculoskeletal:   (+) arthritis,  -       GI/Hepatic:        (-) GERD   Renal/Genitourinary:         Endo:         Psychiatric:         Infectious Disease:         Malignancy:         Other:                          Physical Exam      Airway   Mallampati: II  TM distance: >3 FB  Neck ROM: full    Dental     Cardiovascular   Rhythm and rate: regular and normal      Pulmonary    breath sounds clear to auscultation            Lab Results   Component Value Date    HGB 11.0 (L) 2006     2006    POTASSIUM 3.4 2006    CR 0.56 (L) 2006    PTT 24 2006    INR 0.88 2006       Preop Vitals  BP Readings from Last 3 Encounters:   No data found for BP    Pulse Readings from Last 3 Encounters:   No data found for Pulse      Resp Readings from Last 3 Encounters:   No data found for Resp    SpO2 Readings from Last 3 Encounters:   No data found for SpO2      Temp Readings from Last 1 Encounters:   No data found for Temp    Ht  Readings from Last 1 Encounters:   06/11/19 1.829 m (6')      Wt Readings from Last 1 Encounters:   06/11/19 90.7 kg (200 lb)    Estimated body mass index is 27.12 kg/m  as calculated from the following:    Height as of this encounter: 1.829 m (6').    Weight as of this encounter: 90.7 kg (200 lb).       Anesthesia Plan      History & Physical Review  History and physical reviewed and following examination; no interval change.    ASA Status:  3 .    NPO Status:  > 8 hours    Plan for General, LMA and Periph. Nerve Block for postop pain with Intravenous and Propofol induction. Maintenance will be Balanced.    PONV prophylaxis:  Ondansetron (or other 5HT-3), Dexamethasone or Solumedrol and Scopolamine patch       Postoperative Care  Postoperative pain management:  Oral pain medications, Multi-modal analgesia, IV analgesics and Peripheral nerve block (Single Shot).      Consents  Anesthetic plan, risks, benefits and alternatives discussed with:  Patient..                 Angel Leiva MD                    .

## 2019-06-26 NOTE — BRIEF OP NOTE
[facility] [place]    Brief Operative Note    Pre-operative diagnosis: djd  Post-operative diagnosis same  Procedure: Procedure(s):  Right total knee arthroplasty  Surgeon: Surgeon(s) and Role:     * Willis Bernard MD - Primary     * Ann Juárez PA-C - Assisting  Anesthesia: Combined General with Femoral Nerve Block   Estimated blood loss: Less than 10 ml  Drains: Hemovac  Specimens: * No specimens in log *  Findings:   None.  Complications: None.  Implants:    Implant Name Type Inv. Item Serial No.  Lot No. LRB No. Used   BONE CEMENT RADIOPAQUE SIMPLEX P SPEEDSET 6192-1-001 Cement, Bone BONE CEMENT RADIOPAQUE SIMPLEX P SPEEDSET 6192-1-001  [employer] [id number] Right 1   [id number] PATELLA    ARTHREX [id number] Right 1   IMP COMP TKA IBALANCE FEM PS GERSON SZ 5 RT AR-[id number] Total Joint Component/Insert IMP COMP TKA IBALANCE FEM PS GERSON SZ 5 RT AR-[id number]  AR[employer]X [id number] Right 1   IMP COMP TKA IBALANCE MOD TIBIAL TRAY SZ 5 AR-513-T5 Total Joint Component/Insert IMP COMP TKA IBALANCE MOD TIBIAL TRAY SZ 5 AR-513-T5  [employer] [id number] Right 1   SIZE 5 12MM  TIBIAL INSERT    AR[employer]X [id number] Right 1

## 2019-06-26 NOTE — PHARMACY-ADMISSION MEDICATION HISTORY
Medication history was done by pre-admitting nurse then reviewed by pharmacist as below.    - Vitamin A was deleted, pt does not take anymore.     Pharmacy Complete Anam Garcia SHAD Wed Jun 26, 2019  3:38 PM   Pharmacy In-Progress Leslie Lyles RPH Sun Jun 23, 2019  7:40 PM   Nurse Umu Centeno RN Tue Jun 11, 2019 10:54 AM    Umu Woodward RN Tue Jun 11, 2019 10:44 AM     Janee Garcia, Alena  June 26, 2019          Prior to Admission medications    Medication Sig Last Dose Taking? Auth Provider   alendronate (FOSAMAX) 70 MG tablet Take 70 mg by mouth every 7 days Past Week at Unknown time Yes Reported, Patient   aspirin 81 MG EC tablet Take 81 mg by mouth daily Past Month at Unknown time Yes Reported, Patient   atorvastatin (LIPITOR) 40 MG tablet Take 40 mg by mouth daily 6/25/2019 at Unknown time Yes Reported, Patient   Calcium-Magnesium-Vitamin D (CALCIUM 500 PO) Take by mouth 2 times daily 6/25/2019 at Unknown time Yes Reported, Patient   escitalopram (LEXAPRO) 20 MG tablet Take 20 mg by mouth daily 6/26/2019 at Unknown time Yes Reported, Patient   Multiple Vitamins-Minerals (MULTIVITAMIN PO) Take by mouth daily 6/25/2019 at Unknown time Yes Reported, Patient   Omega-3 Fatty Acids (FISH OIL) 1200 MG capsule Take 1,200 mg by mouth daily Past Month at Unknown time Yes Reported, Patient   potassium chloride ER (KLOR-CON) 8 MEQ CR tablet Take 8 mEq by mouth daily 6/25/2019 at Unknown time Yes Reported, Patient   amLODIPine-benazepril (LOTREL) 5-20 MG capsule Take 1 capsule by mouth daily Unknown at Unknown time  Reported, Patient   bisoprolol-hydrochlorothiazide (ZIAC) 2.5-6.25 MG tablet Take 1 tablet by mouth daily Unknown at Unknown time  Reported, Patient

## 2019-06-26 NOTE — PLAN OF CARE
PT: Luis complete, treatment initiated. Pt seen POD 0 s/p R TKA.  Pt lives with spouse in a rambler style home, 2 steps to enter.  She functions independently at baseline, using SEC recently due to knee pain, owns FWW.     Discharge Planner PT   Patient plan for discharge: home with spouse, OP PT  Current status: Initiated TKA exercises and mobility skills, limited by pain rated 7/10 and lethargy post op yet.  Min A with bed mobility skills and sit <> stand transfers, requires KI yet, amb in room x 35ft with FWW and CGA.  Will need review of everything tomorrow morning due to lethargy.  Barriers to return to prior living situation: limited mobility, stairs, will progress with PT  Recommendations for discharge: In agreement with BPCI: Home with OP PT  Rationale for recommendations: Anticipate patient will progress well given PLOF. With continued PT intervention and ongoing medical management, anticipate pt will be able to meet mobility goals to safely discharge home.        Entered by: Cristino Dolan 06/26/2019 5:09 PM

## 2019-06-27 ENCOUNTER — APPOINTMENT (OUTPATIENT)
Dept: PHYSICAL THERAPY | Facility: CLINIC | Age: 69
DRG: 470 | End: 2019-06-27
Attending: ORTHOPAEDIC SURGERY
Payer: MEDICARE

## 2019-06-27 ENCOUNTER — APPOINTMENT (OUTPATIENT)
Dept: OCCUPATIONAL THERAPY | Facility: CLINIC | Age: 69
DRG: 470 | End: 2019-06-27
Attending: ORTHOPAEDIC SURGERY
Payer: MEDICARE

## 2019-06-27 LAB
GLUCOSE SERPL-MCNC: 126 MG/DL (ref 70–99)
HGB BLD-MCNC: 10.6 G/DL (ref 11.7–15.7)

## 2019-06-27 PROCEDURE — 85018 HEMOGLOBIN: CPT | Performed by: ORTHOPAEDIC SURGERY

## 2019-06-27 PROCEDURE — 99232 SBSQ HOSP IP/OBS MODERATE 35: CPT | Performed by: INTERNAL MEDICINE

## 2019-06-27 PROCEDURE — 97110 THERAPEUTIC EXERCISES: CPT | Mod: GP | Performed by: PHYSICAL THERAPIST

## 2019-06-27 PROCEDURE — 97535 SELF CARE MNGMENT TRAINING: CPT | Mod: GO | Performed by: STUDENT IN AN ORGANIZED HEALTH CARE EDUCATION/TRAINING PROGRAM

## 2019-06-27 PROCEDURE — 97530 THERAPEUTIC ACTIVITIES: CPT | Mod: GP | Performed by: PHYSICAL THERAPIST

## 2019-06-27 PROCEDURE — 36415 COLL VENOUS BLD VENIPUNCTURE: CPT | Performed by: ORTHOPAEDIC SURGERY

## 2019-06-27 PROCEDURE — 82947 ASSAY GLUCOSE BLOOD QUANT: CPT | Performed by: ORTHOPAEDIC SURGERY

## 2019-06-27 PROCEDURE — 97165 OT EVAL LOW COMPLEX 30 MIN: CPT | Mod: GO | Performed by: STUDENT IN AN ORGANIZED HEALTH CARE EDUCATION/TRAINING PROGRAM

## 2019-06-27 PROCEDURE — 25000132 ZZH RX MED GY IP 250 OP 250 PS 637: Mod: GY | Performed by: ORTHOPAEDIC SURGERY

## 2019-06-27 PROCEDURE — 25000128 H RX IP 250 OP 636: Performed by: ORTHOPAEDIC SURGERY

## 2019-06-27 PROCEDURE — 12000000 ZZH R&B MED SURG/OB

## 2019-06-27 PROCEDURE — 25800030 ZZH RX IP 258 OP 636: Performed by: ORTHOPAEDIC SURGERY

## 2019-06-27 PROCEDURE — 97116 GAIT TRAINING THERAPY: CPT | Mod: GP | Performed by: PHYSICAL THERAPIST

## 2019-06-27 RX ADMIN — CEFAZOLIN SODIUM 2 G: 2 INJECTION, SOLUTION INTRAVENOUS at 01:47

## 2019-06-27 RX ADMIN — Medication 6.25 MG: at 08:25

## 2019-06-27 RX ADMIN — ESCITALOPRAM OXALATE 20 MG: 20 TABLET ORAL at 08:25

## 2019-06-27 RX ADMIN — OXYCODONE HYDROCHLORIDE 5 MG: 5 TABLET ORAL at 05:29

## 2019-06-27 RX ADMIN — ACETAMINOPHEN 975 MG: 325 TABLET, FILM COATED ORAL at 21:17

## 2019-06-27 RX ADMIN — SENNOSIDES AND DOCUSATE SODIUM 2 TABLET: 8.6; 5 TABLET ORAL at 08:25

## 2019-06-27 RX ADMIN — MULTIPLE VITAMINS W/ MINERALS TAB 1 TABLET: TAB at 08:25

## 2019-06-27 RX ADMIN — RANITIDINE 150 MG: 150 TABLET ORAL at 19:48

## 2019-06-27 RX ADMIN — CELECOXIB 200 MG: 200 CAPSULE ORAL at 08:25

## 2019-06-27 RX ADMIN — Medication 2.5 MG: at 08:25

## 2019-06-27 RX ADMIN — ACETAMINOPHEN 975 MG: 325 TABLET, FILM COATED ORAL at 14:26

## 2019-06-27 RX ADMIN — SODIUM CHLORIDE: 9 INJECTION, SOLUTION INTRAVENOUS at 02:00

## 2019-06-27 RX ADMIN — GLYCOPYRROLATE 8 MEQ: 0.2 INJECTION INTRAMUSCULAR; INTRAVENOUS at 08:55

## 2019-06-27 RX ADMIN — SENNOSIDES AND DOCUSATE SODIUM 2 TABLET: 8.6; 5 TABLET ORAL at 19:48

## 2019-06-27 RX ADMIN — ACETAMINOPHEN 975 MG: 325 TABLET, FILM COATED ORAL at 05:29

## 2019-06-27 RX ADMIN — LISINOPRIL 20 MG: 20 TABLET ORAL at 08:25

## 2019-06-27 RX ADMIN — ATORVASTATIN CALCIUM 40 MG: 40 TABLET, FILM COATED ORAL at 08:26

## 2019-06-27 RX ADMIN — CELECOXIB 200 MG: 200 CAPSULE ORAL at 19:48

## 2019-06-27 RX ADMIN — RANITIDINE 150 MG: 150 TABLET ORAL at 08:26

## 2019-06-27 RX ADMIN — ENOXAPARIN SODIUM 40 MG: 40 INJECTION SUBCUTANEOUS at 09:45

## 2019-06-27 RX ADMIN — AMLODIPINE BESYLATE 5 MG: 5 TABLET ORAL at 08:25

## 2019-06-27 ASSESSMENT — ACTIVITIES OF DAILY LIVING (ADL)
ADLS_ACUITY_SCORE: 14
PREVIOUS_RESPONSIBILITIES: MEAL PREP;HOUSEKEEPING;LAUNDRY;SHOPPING;MEDICATION MANAGEMENT;FINANCES;DRIVING
ADLS_ACUITY_SCORE: 14
ADLS_ACUITY_SCORE: 15
ADLS_ACUITY_SCORE: 15
ADLS_ACUITY_SCORE: 14
ADLS_ACUITY_SCORE: 16

## 2019-06-27 NOTE — PLAN OF CARE
Discharge Planner PT   Patient plan for discharge: home w/ OP PT  Current status: pain 3-4/10, VC and CGA fro STS, bed mobility min A op leg. PROM  R knee 90, KI needed due to quad lag with SLR. Tolerates ex. Walking 75' x2 with fww, and up/down 4 stairs w/ 2 rails, CGA.   Barriers to return to prior living situation: none  Recommendations for discharge: home w/ OP PT per BPCI plan. Pt will likely be ready for discharge tomorrow.   Rationale for recommendations: . Anticipate patient will progress well given PLOF and current functional status. With continued PT intervention and ongoing medical management, anticipate pt will be able to meet mobility goals to safely discharge home.     PM: Indep SLR x 10, discontinue KI. Progressing well, distracted and a bit impulsive.            Entered by: Martha Lugo 06/27/2019 9:32 AM

## 2019-06-27 NOTE — PLAN OF CARE
A&Ox4, VSS: stable, on Capno with 1L oxygen NC.  Sleeping comfortably throughout the night. CMS: Intact, Drsg: CDI. Pain managed with scheduled Tylenol and PRN oxycodone.  Kevin howard'titi this am.  Due to void. Nursing continue to monitor.

## 2019-06-27 NOTE — CONSULTS
Care Transitions Team: Following for CC, discharge planning, and disposition.       Per chart review, BPCI care plan and PT assess/rec's pt is noted to be aligned with BPCI plan for discharge,  to home with support of family  Pt is anticipating  Outpt PT  @ [facility], to be scheduled by pt.      Pt will schedule Ortho  follow up appointment   Ortho follow up appointment has been scheduled for 7/9/19 at 10:30.      Will follow in collaboration with [facility]  [facility]  [phone] [facility] for discharge planning.     Cecy Daniels RN, BSN CTS  Care Coordinator  [phone]

## 2019-06-27 NOTE — PLAN OF CARE
OT: Evaluation completed, treatment initiated, POD#1 R TKA, had L TKA 2007    Discharge Planner OT   Patient plan for discharge: BPCI: home with family and OP PT  Current status: Pt receptive to compensatory techniques, participated in some dressing tasks, SBA, declined to fully complete tasks as not wanting to get dressed when staying overnight, also declined OT to come back for additional session; pt completed sit<>stand transfers, FWW, close SBA, at times pt impulsive and with poor FWW safety, provided demos and education on importance of maintaining FWW safety and pt able to demonstrate improved safety; completed toilet transfer simulated to home set-up, FWW, SBA with verbal instructions for improved technique  Barriers to return to prior living situation: none anticipated  Recommendations for discharge: in agreement with BPCI: home with family and OP PT  Rationale for recommendations: no further IP OT, pt declined to fully complete dressing tasks or shower transfer, receptive to education and demonstrated ability to manage safely at home       Entered by: Ashley Ndiaye 06/27/2019 4:42 PM     Occupational Therapy Discharge Summary    Reason for therapy discharge:    Discharged to home.    Progress towards therapy goal(s). See goals on Care Plan in [facility] electronic health record for goal details.  Goals partially met.  Barriers to achieving goals:   limited tolerance for therapy.Pt declined to fully complete dressing tasks and step in shower shower, demonstrated ability to manage, will also have assist and support as needed    Therapy recommendation(s):    No further therapy is recommended.

## 2019-06-27 NOTE — PLAN OF CARE
Vital signs:  Temp: 97.1  F (36.2  C) Temp src: Oral BP: 142/63 Pulse: 72 Heart Rate: 61 Resp: 14    Alert and oriented  But forgetful. Up with assist of 2 wit wlaker and gait belt Had 5 mg of oxycodone. Dangled and walked with YOSELYN Brown in [facility], capno on. Oxygen turned down to 1L

## 2019-06-27 NOTE — PROGRESS NOTES
06/27/19 1411   Quick Adds   Type of Visit Initial Occupational Therapy Evaluation   Living Environment   Lives With spouse   Living Arrangements house   Number of Stairs, Main Entrance 2   Number of Stairs, Within Home, Primary   (flight to lower level, laundry)   Transportation Anticipated car, drives self;family or friend will provide   Living Environment Comment pt lives with spouse who does not drive but pt has thier son set-up to provide transportation   Self-Care   Usual Activity Tolerance moderate   Current Activity Tolerance fair   Regular Exercise No   Activity/Exercise/Self-Care Comment pt obtained a RTS   Functional Level   Ambulation 1-->assistive equipment   Transferring 0-->independent   Toileting 0-->independent  (standard height toilet)   Bathing 0-->independent  (step in shower)   Dressing 0-->independent   Fall history within last six months no   Prior Functional Level Comment pt had been using cane for mobility   General Information   Onset of Illness/Injury or Date of Surgery - Date 06/26/19   Referring Physician Joana PONCE   Patient/Family Goals Statement return home   Additional Occupational Profile Info/Pertinent History of Current Problem POD#1 R TKA, pt had L TKA 2007   Precautions/Limitations fall precautions   Weight-Bearing Status - RLE weight-bearing as tolerated  (KI donned during session)   Cognitive Status Examination   Orientation orientation to person, place and time   Level of Consciousness alert   Follows Commands (Cognition) WFL   Pain Assessment   Patient Currently in Pain Yes, see Vital Sign flowsheet   Transfer Skills   Transfer Comments FWW, CGA-SBA   Transfer Skill: Sit to Stand   Level of [place]: Sit/Stand stand-by assist   Physical Assist/Nonphysical Assist: Sit/Stand verbal cues   Transfer Skill: Sit to Stand weight-bearing as tolerated   Assistive Device for Transfer: Sit/Stand rolling walker   Transfer Skill: Toilet Transfer   Level of [place]: Toilet  "stand-by assist   Physical Assist/Nonphysical Assist: Toilet verbal cues   Weight-Bearing Restrictions: Toilet weight-bearing as tolerated   Assistive Device rolling walker   Balance   Balance Comments impaired post-op   Lower Body Dressing   Level of [place]: Dress Lower Body stand-by assist   Physical Assist/Nonphysical Assist: Dress Lower Body verbal cues   Toileting   Level of [place]: Toilet stand-by assist   Physical Assist/Nonphysical Assist: Toilet verbal cues   Grooming   Level of [place]: Grooming stand-by assist   Physical Assist/Nonphysical Assist: Grooming verbal cues   Instrumental Activities of Daily Living (IADL)   Previous Responsibilities meal prep;housekeeping;laundry;shopping;medication management;finances;driving   Activities of Daily Living Analysis   Impairments Contributing to Impaired Activities of Daily Living balance impaired;pain;strength decreased   General Therapy Interventions   Planned Therapy Interventions ADL retraining;IADL retraining;transfer training   Clinical Impression   Criteria for Skilled Therapeutic Interventions Met yes, treatment indicated   OT Diagnosis impaired ability to manage ADL/IADLs   Influenced by the following impairments post-op pain, fatigue, balance   Assessment of Occupational Performance 3-5 Performance Deficits   Identified Performance Deficits dressing, bathing, toileting, meals, driving   Clinical Decision Making (Complexity) Low complexity   Therapy Frequency Daily   Predicted Duration of Therapy Intervention (days/wks) one time eval/treat   Anticipated Discharge Disposition Home with Assist   Risks and Benefits of Treatment have been explained. Yes   Patient, Family & other staff in agreement with plan of care Yes   Clinical Impression Comments demonstrates ability to benefit from skilled OT services   [facility] AM-PAC TM \"6 Clicks\"   2016, Trustees of [facility], under license to [employer].  All rights reserved.   6 " "Clicks Short Forms Daily Activity Inpatient Short Form   [facility] AM-PAC  \"6 Clicks\" Daily Activity Inpatient Short Form   1. Putting on and taking off regular lower body clothing? 3 - A Little   2. Bathing (including washing, rinsing, drying)? 3 - A Little   3. Toileting, which includes using toilet, bedpan or urinal? 3 - A Little   4. Putting on and taking off regular upper body clothing? 4 - None   5. Taking care of personal grooming such as brushing teeth? 4 - None   6. Eating meals? 4 - None   Daily Activity Raw Score (Score out of 24.Lower scores equate to lower levels of function) 21   Total Evaluation Time   Total Evaluation Time (Minutes) 8     "

## 2019-06-27 NOTE — PROGRESS NOTES
"Orthopedic Surgery  6/27/2019  POD#: 1    S: Patient voices no complaints today. Denies calf pain, dizziness, SOB.    O: Blood pressure 121/52, pulse 85, temperature 98  F (36.7  C), temperature source Oral, resp. rate 18, height 1.829 m (6' 0.01\"), weight 93.4 kg (206 lb), SpO2 94 %.  Lab Results   Component Value Date    HGB 10.6 06/27/2019     Lab Results   Component Value Date    INR 0.88 09/11/2006        I/O last 3 completed shifts:  In: 5339 [P.O.:650; I.V.:3689; IV Piggyback:1000]  Out: 4680 [Urine:3875; Drains:400; Blood:405]    Neurovascularly intact.  Calves are negative bilaterally, both soft and nontender.  The wound is C/D/I.  The wound looks good with minimal erythema of the surrounding skin.    A: Ms. Villareal is doing well status post Procedure(s):  Right total knee arthroplasty using an Arthrex iBalance knee system, size 5 femur, size 5 tibia and a 34 tri-peg patellar button.    P:   1. Mobilize and continue physical therapy.   2. Anticoagulation - discharge on ASA  3. Pain management - controlled  4. Anticipate discharge to home tomorrow or Sat.      Ann Juárez PA-C  O: [phone]  "

## 2019-06-27 NOTE — PROGRESS NOTES
[facility]    Hospitalist Progress Note  Provider : Jackie Constantino MD  Date of Service (when I saw the patient): 06/27/2019    Assessment & Plan   Renee Villareal is a 68 year old female patient with past medial history of CAD, osteoarthritis, hypertension, admitted for elective surgery. She underwent right total knee arthroplasty on 6/26. We were consulted for postop medical management     1. S/P right TKA, POD#1  --Pan management, DVT prophylaxis, PT/OT per primary team     2. CAD   --Currently no evidence of chest pain. Continue Lipitor. Aspirin on hold for now     3. Hypertension. Continue  amlodipine. Will monitor blood pressure closely     DVT Prophylaxis: Defer to primary service    Code Status: Full Code     Disposition: Expected discharge per primary team     Code Status: Full Code    Disposition: Expected discharge in 1-2 days    Jackie Constantino MD    Interval History   Patient seen and examined. She stated that she is doing well. She denies new symptoms. She has no nausea or vomiting    -Data reviewed today: I reviewed all new labs and imaging results over the last 24 hours. I personally reviewed       Physical Exam   Temp: 98  F (36.7  C) Temp src: Oral BP: 123/58 Pulse: 85 Heart Rate: 63 Resp: 18 SpO2: 97 % O2 Device: None (Room air) Oxygen Delivery: 1 LPM  Vitals:    06/11/19 1059 06/26/19 0826   Weight: 90.7 kg (200 lb) 93.4 kg (206 lb)     Vital Signs with Ranges  Temp:  [95.5  F (35.3  C)-98  F (36.7  C)] 98  F (36.7  C)  Pulse:  [84-85] 85  Heart Rate:  [56-70] 63  Resp:  [10-21] 18  BP: (111-142)/(52-82) 123/58  SpO2:  [90 %-99 %] 97 %  I/O last 3 completed shifts:  In: 5339 [P.O.:650; I.V.:3689; IV Piggyback:1000]  Out: 4680 [Urine:3875; Drains:400; Blood:405]    GEN:  Alert, oriented x 3, appears comfortable, NAD.  HEENT:  Normocephalic/atraumatic, no scleral icterus, no nasal discharge, mouth moist.  CV:  Regular rate and rhythm, no murmur or JVD.  S1 + S2 noted, no  S3 or S4.  LUNGS:  Clear to auscultation bilaterally without rales/rhonchi/wheezing/retractions.  Symmetric chest rise on inhalation noted.  ABD:  Active bowel sounds, soft, non-tender/non-distended.  No rebound/guarding/rigidity.  EXT:  No edema or cyanosis.  Hands/feet warm to touch with good signs of peripheral perfusion.  No joint synovitis noted.  SKIN:  Dry to touch, no exanthems noted in the visualized areas.  NEURO:  Symmetric muscle strength, sensation to touch grossly intact.  No new focal deficits appreciated.    Medications     sodium chloride 100 mL/hr at 06/27/19 0200       acetaminophen  975 mg Oral Q8H     amLODIPine  5 mg Oral Daily    And     lisinopril  20 mg Oral Daily     atorvastatin  40 mg Oral Daily     bisoprolol  2.5 mg Oral Daily    And     hydrochlorothiazide  6.25 mg Oral Daily     celecoxib  200 mg Oral BID     enoxaparin  40 mg Subcutaneous Q24H     escitalopram  20 mg Oral Daily     fish oil-omega-3 fatty acids  1,000 mg Oral Daily     multivitamin w/minerals  1 tablet Oral Daily     ondansetron  4 mg Intravenous Q6H     potassium chloride  8 mEq Oral Daily     ranitidine  150 mg Oral BID     scopolamine  1 patch Transdermal Q72H    And     scopolamine   Transdermal Q8H    And     [START ON 6/29/2019] scopolamine   Transdermal Q72H     senna-docusate  1 tablet Oral BID    Or     senna-docusate  2 tablet Oral BID     sodium chloride (PF)  3 mL Intracatheter Q8H       Data   Recent Labs   Lab 06/27/19  0629 06/26/19  1621   HGB 10.6*  --    PLT  --  245   CR  --  0.52   *  --        No results found for this or any previous visit (from the past 24 hour(s)).

## 2019-06-27 NOTE — PLAN OF CARE
Alert and oriented. Tolerating regular diet. Transfers with Ax1, gait belt, and walker. Dressing CDI, CMS intact. Voiding in adequate amounts. Pain managed with scheduled Tylenol. Plans d/c to home tomorrow vs. Saturday.

## 2019-06-28 ENCOUNTER — APPOINTMENT (OUTPATIENT)
Dept: PHYSICAL THERAPY | Facility: CLINIC | Age: 69
DRG: 470 | End: 2019-06-28
Attending: ORTHOPAEDIC SURGERY
Payer: MEDICARE

## 2019-06-28 VITALS
RESPIRATION RATE: 16 BRPM | WEIGHT: 206 LBS | HEART RATE: 85 BPM | BODY MASS INDEX: 27.9 KG/M2 | DIASTOLIC BLOOD PRESSURE: 66 MMHG | TEMPERATURE: 99 F | OXYGEN SATURATION: 97 % | HEIGHT: 72 IN | SYSTOLIC BLOOD PRESSURE: 136 MMHG

## 2019-06-28 LAB
GLUCOSE SERPL-MCNC: 125 MG/DL (ref 70–99)
HGB BLD-MCNC: 9.5 G/DL (ref 11.7–15.7)

## 2019-06-28 PROCEDURE — 85018 HEMOGLOBIN: CPT | Performed by: ORTHOPAEDIC SURGERY

## 2019-06-28 PROCEDURE — 25000132 ZZH RX MED GY IP 250 OP 250 PS 637: Mod: GY | Performed by: ORTHOPAEDIC SURGERY

## 2019-06-28 PROCEDURE — 25000128 H RX IP 250 OP 636: Performed by: ORTHOPAEDIC SURGERY

## 2019-06-28 PROCEDURE — 97116 GAIT TRAINING THERAPY: CPT | Mod: GP | Performed by: PHYSICAL THERAPY ASSISTANT

## 2019-06-28 PROCEDURE — 82947 ASSAY GLUCOSE BLOOD QUANT: CPT | Performed by: ORTHOPAEDIC SURGERY

## 2019-06-28 PROCEDURE — 36415 COLL VENOUS BLD VENIPUNCTURE: CPT | Performed by: ORTHOPAEDIC SURGERY

## 2019-06-28 PROCEDURE — 97110 THERAPEUTIC EXERCISES: CPT | Mod: GP | Performed by: PHYSICAL THERAPY ASSISTANT

## 2019-06-28 RX ADMIN — Medication 6.25 MG: at 09:21

## 2019-06-28 RX ADMIN — ATORVASTATIN CALCIUM 40 MG: 40 TABLET, FILM COATED ORAL at 09:21

## 2019-06-28 RX ADMIN — CELECOXIB 200 MG: 200 CAPSULE ORAL at 09:21

## 2019-06-28 RX ADMIN — ESCITALOPRAM OXALATE 20 MG: 20 TABLET ORAL at 09:22

## 2019-06-28 RX ADMIN — RANITIDINE 150 MG: 150 TABLET ORAL at 09:21

## 2019-06-28 RX ADMIN — GLYCOPYRROLATE 8 MEQ: 0.2 INJECTION INTRAMUSCULAR; INTRAVENOUS at 09:22

## 2019-06-28 RX ADMIN — LISINOPRIL 20 MG: 20 TABLET ORAL at 09:20

## 2019-06-28 RX ADMIN — ACETAMINOPHEN 975 MG: 325 TABLET, FILM COATED ORAL at 06:03

## 2019-06-28 RX ADMIN — MULTIPLE VITAMINS W/ MINERALS TAB 1 TABLET: TAB at 09:21

## 2019-06-28 RX ADMIN — OMEGA-3 FATTY ACIDS CAP 1000 MG 1000 MG: 1000 CAP at 09:21

## 2019-06-28 RX ADMIN — Medication 2.5 MG: at 09:21

## 2019-06-28 RX ADMIN — SENNOSIDES AND DOCUSATE SODIUM 2 TABLET: 8.6; 5 TABLET ORAL at 09:21

## 2019-06-28 RX ADMIN — ENOXAPARIN SODIUM 40 MG: 40 INJECTION SUBCUTANEOUS at 10:01

## 2019-06-28 RX ADMIN — AMLODIPINE BESYLATE 5 MG: 5 TABLET ORAL at 09:22

## 2019-06-28 ASSESSMENT — ACTIVITIES OF DAILY LIVING (ADL)
ADLS_ACUITY_SCORE: 12
ADLS_ACUITY_SCORE: 12
ADLS_ACUITY_SCORE: 14
ADLS_ACUITY_SCORE: 14

## 2019-06-28 NOTE — PROGRESS NOTES
"Orthopedic Surgery  6/28/2019  POD#: 2    S: Patient voices no complaints today. Feeling well with minimal pain.    O: Blood pressure 138/59, pulse 85, temperature 98.5  F (36.9  C), temperature source Oral, resp. rate 18, height 1.829 m (6' 0.01\"), weight 93.4 kg (206 lb), SpO2 93 %.  Lab Results   Component Value Date    HGB 10.6 06/27/2019     Lab Results   Component Value Date    INR 0.88 09/11/2006        I/O last 3 completed shifts:  In: 1272 [P.O.:1040; I.V.:232]  Out: 1020 [Urine:1000; Drains:20]    Neurovascularly intact.  Calves are negative bilaterally, both soft and nontender.  The wound is C/D/I.  The wound looks good with minimal erythema of the surrounding skin.    A: Ms. Villareal is doing well status post Procedure(s):  Right total knee arthroplasty using an Arthrex iBalance knee system, size 5 femur, size 5 tibia and a 34 tri-peg patellar button.    P:   1. Mobilize and continue physical therapy.   2. Anticoagulation - discharge on ASA  3. Pain management - controlled  4. Anticipate discharge to home this evening or tomorrow, pending progress with therapy.      Ann Juárez PA-C  O: [phone]  "

## 2019-06-28 NOTE — PLAN OF CARE
VSS. Dressing is intact with scant drainage. Pain managed with scheduled Tylenol. Assist of 1 with a walker and gait belt. Voiding adequately.

## 2019-06-28 NOTE — PLAN OF CARE
A&Ox4. VSS. A1 with gb and walker. Dressing, dried drainage. CMS intact. Scheduled tylenol adequate for pain management. Ice for comfort. Meds for discharge oxy-acetaminophen, senna and aspirin. AVS done.

## 2019-06-28 NOTE — PLAN OF CARE
Discharge Planner PT   Patient plan for discharge: home w/ OP PT  Current status:goals met ROM -8 to 101 gait with Rw to 20 feet up and down stairs as needed for home  Barriers to return to prior living situation: none  Recommendations for discharge: home w/ OP PT per BPCI plan. And per plan established by the PT.   Rationale for recommendations: goal are all met recommend to PT for discharge to home this date         Entered by: Yoanna Mcgill 06/28/2019 10:31 AM

## 2019-06-28 NOTE — PLAN OF CARE
A&O. LS clear, dressing intact. FLAQUITO drain in place. French speaking. Able to make needs known.  I-pad available and offered to patient, declined at this time. Pain managed with IV dilaudid and oxycodone. Assist of 2 to BSC with a walker and gait belt. Voiding adequately. Painful when up, pain settles down when at rest. On IV Rocephin and Vanco.

## 2019-07-09 NOTE — DISCHARGE SUMMARY
"Discharge Summary    Renee Villareal MRN# [id number]   YOB: 1950 Age: 68 year old     Date of Admission: 6/26/2019    Date of Discharge: 6/28/2019    Reason for Admission: Renee Villareal is an 68 year old female who was admitted to the hospital following surgery with Dr. Willis Bernard.    Preoperative Diagnosis: djd    Postoperative Diagnosis: djd    Procedure Completed: right total knee arthroplasty     Hospital Course:  Ms. Villareal was admitted and underwent the above procedure. The patient tolerated the procedure well. There were no complications. Following surgery she was admitted to the floor.  During her stay she did not require any blood transfusions.  Her last hemoglobin was noted to be   Lab Results   Component Value Date    HGB 9.5 06/28/2019   .  Her pain was controlled with oral pain medication.  During her stay she progressed well in physical therapy and all the therapy goals were met.     Discharge Physical Exam:  /66 (BP Location: Right arm)   Pulse 85   Temp 99  F (37.2  C) (Oral)   Resp 16   Ht 1.829 m (6' 0.01\")   Wt 93.4 kg (206 lb)   SpO2 97%   BMI 27.93 kg/m    Neurovascularly intact, distal pulses present bilaterally.  Calves are negative bilaterally, both soft and nontender.  The wound looks good with minimal erythema of the surrounding skin.    Assessment: Ms. Villareal is stable and doing well status post right total knee arthroplasty on POD #2.    Plan: We will discharge her home on analgesics and deep venous thrombosis prophylaxis.  She will follow-up with Ann Juárez PA-C or Dr. Willis Bernard approximately 2 weeks from surgery.  She may call [phone] to schedule an appointment.    Discharge Instructions:  Discharge diet: Regular   Discharge activity: Weight bear as tolerated.  Advance from the walker to a cane as tolerated.  Discontinue the use of cane when comfortable.   Physical therapy: Work on therapy exercises given in the hospital.  "   Discharge follow-up: Follow up with Ann Guerrero PA-C in 10-14 days.   Anticoagulation Asprin 325 mg twice daily for 5 weeks.   Wound care: Leave aquacel dressing in place until post-op follow-up.  If it becomes loose or soiled then remove and replace with daily dry dressings.  Keep wound clean and dry.  May get incision area wet in shower but do not soak or scrub.         Meds:   Niya Villareal   Home Medication Instructions SHANE:[phone]    Printed on:07/09/19 0073   Medication Information                      alendronate (FOSAMAX) 70 MG tablet  Take 70 mg by mouth every 7 days             amLODIPine-benazepril (LOTREL) 5-20 MG capsule  Take 1 capsule by mouth daily             aspirin (ASA) 325 MG EC tablet  Take one Aspirin tab twice daily for 5 weeks.             atorvastatin (LIPITOR) 40 MG tablet  Take 40 mg by mouth daily             bisoprolol-hydrochlorothiazide (ZIAC) 2.5-6.25 MG tablet  Take 1 tablet by mouth daily             Calcium-Magnesium-Vitamin D (CALCIUM 500 PO)  Take by mouth 2 times daily             escitalopram (LEXAPRO) 20 MG tablet  Take 20 mg by mouth daily             Multiple Vitamins-Minerals (MULTIVITAMIN PO)  Take by mouth daily             Omega-3 Fatty Acids (FISH OIL) 1200 MG capsule  Take 1,200 mg by mouth daily             oxyCODONE-acetaminophen (PERCOCET) 5-325 MG tablet  Take 1-2 tablets by mouth every 4 hours as needed for severe pain             potassium chloride ER (KLOR-CON) 8 MEQ CR tablet  Take 8 mEq by mouth daily             senna-docusate (SENOKOT-S/PERICOLACE) 8.6-50 MG tablet  Take 1 tablet by mouth 2 times daily                     Ann Juárez PA-C  O: [phone]

## 2019-07-26 ENCOUNTER — HOSPITAL LABORATORY (OUTPATIENT)
Dept: OTHER | Facility: CLINIC | Age: 69
End: 2019-07-26

## 2019-07-26 LAB
CRYSTALS SNV MICRO: NORMAL
GRAM STN SPEC: NORMAL
SPECIMEN SOURCE SNV: NORMAL
SPECIMEN SOURCE: NORMAL

## 2019-07-31 LAB
BACTERIA SPEC CULT: NO GROWTH
SPECIMEN SOURCE: NORMAL

## 2019-08-09 LAB
BACTERIA SPEC CULT: NORMAL
Lab: NORMAL
SPECIMEN SOURCE: NORMAL

## 2022-06-08 ENCOUNTER — HOSPITAL ENCOUNTER (OUTPATIENT)
Dept: NUCLEAR MEDICINE | Facility: CLINIC | Age: 72
Setting detail: NUCLEAR MEDICINE
Discharge: HOME | End: 2022-06-08
Attending: ORTHOPAEDIC SURGERY
Payer: COMMERCIAL

## 2022-06-08 DIAGNOSIS — M25.569 KNEE PAIN: ICD-10-CM

## 2022-06-08 PROCEDURE — A9503 TC99M MEDRONATE: HCPCS | Performed by: ORTHOPAEDIC SURGERY

## 2022-06-08 PROCEDURE — 78315 BONE IMAGING 3 PHASE: CPT

## 2022-06-08 PROCEDURE — 343N000001 HC RX 343: Performed by: ORTHOPAEDIC SURGERY

## 2022-06-08 RX ORDER — TC 99M MEDRONATE 20 MG/10ML
25 INJECTION, POWDER, LYOPHILIZED, FOR SOLUTION INTRAVENOUS ONCE
Status: COMPLETED | OUTPATIENT
Start: 2022-06-08 | End: 2022-06-08

## 2022-06-08 RX ADMIN — TC 99M MEDRONATE 25 MCI.: 20 INJECTION, POWDER, LYOPHILIZED, FOR SOLUTION INTRAVENOUS at 09:10

## (undated) DEVICE — SOL NACL 0.9% IRRIG 3000ML BAG 2B7477

## (undated) DEVICE — TOURNIQUET CUFF 30" REPRO BLUE 60-7070-105

## (undated) DEVICE — SUTURE MONOCRYL+ 2-0 CT-1 36" UNDYED MCP945H

## (undated) DEVICE — LINEN DRAPE 54X72" 5467

## (undated) DEVICE — BLADE SAW SAGITTAL STRK 19.5X90X1.27MM 2108-109-000S11

## (undated) DEVICE — SET HANDPIECE INTERPULSE W/COAXIAL FAN SPRAY TIP 0210118000

## (undated) DEVICE — DRSG AQUACEL AG 3.5X9.75" HYDROFIBER 412011

## (undated) DEVICE — PACK TOTAL KNEE BOXED LATEX FREE PO15TKFCT

## (undated) DEVICE — CAST PADDING 6" STERILE 9046S

## (undated) DEVICE — BONE CEMENT MIXEVAC III HI VAC KIT  0206-015-000

## (undated) DEVICE — GLOVE PROTEXIS BLUE W/NEU-THERA 7.0  2D73EB70

## (undated) DEVICE — DRAIN HEMOVAC RESERVOIR KIT 10FR 1/8" MED 00-2550-002-10

## (undated) DEVICE — CLEANSER JET LAVAGE IRRISEPT 0.05% CHG IRRISEPT45USA

## (undated) DEVICE — Device

## (undated) DEVICE — PREP CHLORAPREP 26ML TINTED ORANGE  260815

## (undated) DEVICE — SUCTION MANIFOLD NEPTUNE 2 SYS 4 PORT 0702-020-000

## (undated) DEVICE — SOL WATER IRRIG 1000ML BOTTLE 2F7114

## (undated) DEVICE — CATH TRAY FOLEY SURESTEP 16FR DRAIN BAG STATOCK A899916

## (undated) DEVICE — GLOVE PROTEXIS POWDER FREE SMT 8.0  2D72PT80X

## (undated) DEVICE — DRAPE STERI U 1015

## (undated) DEVICE — BLADE SAW SAGITTAL STRK 25X75X0.89MM SYS 6 6125-089-075

## (undated) DEVICE — HOOD FLYTE W/PEELAWAY 408-800-100

## (undated) DEVICE — LINEN ORTHO ACL PACK 5447

## (undated) DEVICE — GLOVE PROTEXIS POWDER FREE 7.5 ORTHOPEDIC 2D73ET75

## (undated) DEVICE — SU VICRYL+ 1 MO-4 18" DYED VCP702D

## (undated) DEVICE — BAG CLEAR TRASH 1.3M 39X33" P4040C

## (undated) DEVICE — LINEN FULL SHEET 5511

## (undated) DEVICE — GLOVE PROTEXIS BLUE W/NEU-THERA 8.0  2D73EB80

## (undated) DEVICE — SUTURE VICRYL+ 0 CT-1 18" DYED VIO VCP740D

## (undated) DEVICE — GLOVE PROTEXIS POWDER FREE 7.0 ORTHOPEDIC 2D73ET70

## (undated) RX ORDER — PANTOPRAZOLE SODIUM 40 MG/1
TABLET, DELAYED RELEASE ORAL
Status: DISPENSED
Start: 2019-06-26

## (undated) RX ORDER — FENTANYL CITRATE 50 UG/ML
INJECTION, SOLUTION INTRAMUSCULAR; INTRAVENOUS
Status: DISPENSED
Start: 2019-06-26

## (undated) RX ORDER — CELECOXIB 200 MG/1
CAPSULE ORAL
Status: DISPENSED
Start: 2019-06-26

## (undated) RX ORDER — CEFAZOLIN SODIUM 2 G/100ML
INJECTION, SOLUTION INTRAVENOUS
Status: DISPENSED
Start: 2019-06-26

## (undated) RX ORDER — PROPOFOL 10 MG/ML
INJECTION, EMULSION INTRAVENOUS
Status: DISPENSED
Start: 2019-06-26

## (undated) RX ORDER — HYDRALAZINE HYDROCHLORIDE 20 MG/ML
INJECTION INTRAMUSCULAR; INTRAVENOUS
Status: DISPENSED
Start: 2019-06-26

## (undated) RX ORDER — LIDOCAINE HYDROCHLORIDE 10 MG/ML
INJECTION, SOLUTION EPIDURAL; INFILTRATION; INTRACAUDAL; PERINEURAL
Status: DISPENSED
Start: 2019-06-26

## (undated) RX ORDER — EPHEDRINE SULFATE 50 MG/ML
INJECTION, SOLUTION INTRAMUSCULAR; INTRAVENOUS; SUBCUTANEOUS
Status: DISPENSED
Start: 2019-06-26

## (undated) RX ORDER — ACETAMINOPHEN 325 MG/1
TABLET ORAL
Status: DISPENSED
Start: 2019-06-26

## (undated) RX ORDER — OXYCODONE HYDROCHLORIDE 5 MG/1
TABLET ORAL
Status: DISPENSED
Start: 2019-06-26

## (undated) RX ORDER — DEXAMETHASONE SODIUM PHOSPHATE 4 MG/ML
INJECTION, SOLUTION INTRA-ARTICULAR; INTRALESIONAL; INTRAMUSCULAR; INTRAVENOUS; SOFT TISSUE
Status: DISPENSED
Start: 2019-06-26

## (undated) RX ORDER — SCOLOPAMINE TRANSDERMAL SYSTEM 1 MG/1
PATCH, EXTENDED RELEASE TRANSDERMAL
Status: DISPENSED
Start: 2019-06-26

## (undated) RX ORDER — ONDANSETRON 2 MG/ML
INJECTION INTRAMUSCULAR; INTRAVENOUS
Status: DISPENSED
Start: 2019-06-26